# Patient Record
Sex: FEMALE | Race: WHITE | Employment: OTHER | URBAN - METROPOLITAN AREA
[De-identification: names, ages, dates, MRNs, and addresses within clinical notes are randomized per-mention and may not be internally consistent; named-entity substitution may affect disease eponyms.]

---

## 2018-07-11 LAB — ANTIBODY: NORMAL

## 2018-11-29 LAB — MAMMOGRAPHY, EXTERNAL: NORMAL

## 2022-03-07 ENCOUNTER — OFFICE VISIT (OUTPATIENT)
Dept: PRIMARY CARE CLINIC | Age: 72
End: 2022-03-07
Payer: COMMERCIAL

## 2022-03-07 VITALS
SYSTOLIC BLOOD PRESSURE: 128 MMHG | BODY MASS INDEX: 28.61 KG/M2 | DIASTOLIC BLOOD PRESSURE: 82 MMHG | RESPIRATION RATE: 22 BRPM | HEART RATE: 88 BPM | WEIGHT: 178 LBS | OXYGEN SATURATION: 98 % | HEIGHT: 66 IN

## 2022-03-07 DIAGNOSIS — R73.03 PREDIABETES: ICD-10-CM

## 2022-03-07 DIAGNOSIS — R30.0 BURNING WITH URINATION: ICD-10-CM

## 2022-03-07 DIAGNOSIS — Z00.00 HEALTHCARE MAINTENANCE: ICD-10-CM

## 2022-03-07 DIAGNOSIS — R20.8 BURNING SENSATION: Primary | ICD-10-CM

## 2022-03-07 DIAGNOSIS — D22.9 SKIN MOLE: ICD-10-CM

## 2022-03-07 DIAGNOSIS — H53.9 VISION CHANGES: ICD-10-CM

## 2022-03-07 PROBLEM — G89.29 CHRONIC BILATERAL LOW BACK PAIN WITH BILATERAL SCIATICA: Status: ACTIVE | Noted: 2018-07-11

## 2022-03-07 PROBLEM — M54.42 CHRONIC BILATERAL LOW BACK PAIN WITH BILATERAL SCIATICA: Status: ACTIVE | Noted: 2018-07-11

## 2022-03-07 PROBLEM — M54.41 CHRONIC BILATERAL LOW BACK PAIN WITH BILATERAL SCIATICA: Status: ACTIVE | Noted: 2018-07-11

## 2022-03-07 LAB
BILIRUBIN URINE: NEGATIVE
BILIRUBIN, POC: NORMAL
BLOOD URINE, POC: NORMAL
BLOOD, URINE: NEGATIVE
CLARITY, POC: NORMAL
CLARITY: CLEAR
COLOR, POC: NORMAL
COLOR: YELLOW
GLUCOSE URINE, POC: NORMAL
GLUCOSE URINE: NEGATIVE MG/DL
KETONES, POC: NORMAL
KETONES, URINE: NEGATIVE MG/DL
LEUKOCYTE EST, POC: NORMAL
LEUKOCYTE ESTERASE, URINE: NEGATIVE
MICROSCOPIC EXAMINATION: NORMAL
NITRITE, POC: NORMAL
NITRITE, URINE: NEGATIVE
PH UA: 6 (ref 5–8)
PH, POC: 5
PROTEIN UA: NEGATIVE MG/DL
PROTEIN, POC: NORMAL
SPECIFIC GRAVITY UA: 1.01 (ref 1–1.03)
SPECIFIC GRAVITY, POC: <=1.005
URINE REFLEX TO CULTURE: NORMAL
URINE TYPE: NORMAL
UROBILINOGEN, POC: 0.2
UROBILINOGEN, URINE: 0.2 E.U./DL

## 2022-03-07 PROCEDURE — 1123F ACP DISCUSS/DSCN MKR DOCD: CPT | Performed by: STUDENT IN AN ORGANIZED HEALTH CARE EDUCATION/TRAINING PROGRAM

## 2022-03-07 PROCEDURE — 1036F TOBACCO NON-USER: CPT | Performed by: STUDENT IN AN ORGANIZED HEALTH CARE EDUCATION/TRAINING PROGRAM

## 2022-03-07 PROCEDURE — 36415 COLL VENOUS BLD VENIPUNCTURE: CPT | Performed by: STUDENT IN AN ORGANIZED HEALTH CARE EDUCATION/TRAINING PROGRAM

## 2022-03-07 PROCEDURE — G8417 CALC BMI ABV UP PARAM F/U: HCPCS | Performed by: STUDENT IN AN ORGANIZED HEALTH CARE EDUCATION/TRAINING PROGRAM

## 2022-03-07 PROCEDURE — 99204 OFFICE O/P NEW MOD 45 MIN: CPT | Performed by: STUDENT IN AN ORGANIZED HEALTH CARE EDUCATION/TRAINING PROGRAM

## 2022-03-07 PROCEDURE — 3017F COLORECTAL CA SCREEN DOC REV: CPT | Performed by: STUDENT IN AN ORGANIZED HEALTH CARE EDUCATION/TRAINING PROGRAM

## 2022-03-07 PROCEDURE — G8400 PT W/DXA NO RESULTS DOC: HCPCS | Performed by: STUDENT IN AN ORGANIZED HEALTH CARE EDUCATION/TRAINING PROGRAM

## 2022-03-07 PROCEDURE — G8427 DOCREV CUR MEDS BY ELIG CLIN: HCPCS | Performed by: STUDENT IN AN ORGANIZED HEALTH CARE EDUCATION/TRAINING PROGRAM

## 2022-03-07 PROCEDURE — 4040F PNEUMOC VAC/ADMIN/RCVD: CPT | Performed by: STUDENT IN AN ORGANIZED HEALTH CARE EDUCATION/TRAINING PROGRAM

## 2022-03-07 PROCEDURE — 81002 URINALYSIS NONAUTO W/O SCOPE: CPT | Performed by: STUDENT IN AN ORGANIZED HEALTH CARE EDUCATION/TRAINING PROGRAM

## 2022-03-07 PROCEDURE — G8484 FLU IMMUNIZE NO ADMIN: HCPCS | Performed by: STUDENT IN AN ORGANIZED HEALTH CARE EDUCATION/TRAINING PROGRAM

## 2022-03-07 PROCEDURE — 1090F PRES/ABSN URINE INCON ASSESS: CPT | Performed by: STUDENT IN AN ORGANIZED HEALTH CARE EDUCATION/TRAINING PROGRAM

## 2022-03-07 PROCEDURE — 81003 URINALYSIS AUTO W/O SCOPE: CPT | Performed by: STUDENT IN AN ORGANIZED HEALTH CARE EDUCATION/TRAINING PROGRAM

## 2022-03-07 SDOH — ECONOMIC STABILITY: FOOD INSECURITY: WITHIN THE PAST 12 MONTHS, YOU WORRIED THAT YOUR FOOD WOULD RUN OUT BEFORE YOU GOT MONEY TO BUY MORE.: NEVER TRUE

## 2022-03-07 SDOH — ECONOMIC STABILITY: FOOD INSECURITY: WITHIN THE PAST 12 MONTHS, THE FOOD YOU BOUGHT JUST DIDN'T LAST AND YOU DIDN'T HAVE MONEY TO GET MORE.: NEVER TRUE

## 2022-03-07 ASSESSMENT — PATIENT HEALTH QUESTIONNAIRE - PHQ9
1. LITTLE INTEREST OR PLEASURE IN DOING THINGS: 0
2. FEELING DOWN, DEPRESSED OR HOPELESS: 0
SUM OF ALL RESPONSES TO PHQ9 QUESTIONS 1 & 2: 0
SUM OF ALL RESPONSES TO PHQ QUESTIONS 1-9: 0

## 2022-03-07 ASSESSMENT — ENCOUNTER SYMPTOMS
ABDOMINAL PAIN: 0
SHORTNESS OF BREATH: 0
ROS SKIN COMMENTS: LESIONS ON SCALP
WHEEZING: 0

## 2022-03-07 ASSESSMENT — SOCIAL DETERMINANTS OF HEALTH (SDOH): HOW HARD IS IT FOR YOU TO PAY FOR THE VERY BASICS LIKE FOOD, HOUSING, MEDICAL CARE, AND HEATING?: NOT HARD AT ALL

## 2022-03-07 NOTE — ASSESSMENT & PLAN NOTE
Lesions could be SKs, but skin cancer needs to be ruled out.   Patient is agreeable for referral to dermatologist.

## 2022-03-07 NOTE — PROGRESS NOTES
[facility]    Patient:  Janina Quinteros 73 y.o. female     Date of Service: 3/7/2022       Chief complaint:   Chief Complaint   Patient presents with    New Patient     Np to establish    Blood Work     Rquesting for blood work.  Mole    Other     c/o having burning sensation on bilateral feets.  Urinary Tract Infection     c/o burning sensation, frequent urination. History of Present Illness   Patient presents today to establish care. She reports PMHx of prediabetes. Patient is currently not on any medication. Past surgical history consisting of left knee replacement. Family history reviewed. Patient denies smoking cigarettes, drinking alcohol, or recreational drug use. Prediabetes: Diagnosed several years ago. She has not been on any medication. She checks blood sugar at home with variable results. Last medical visit and assessment was around 6 years ago. Moles on the scalp: Recurrent; s/p multiple laser procedures. Typically after the laser procedure the lesions goes away completely then returns after a few months. Most recent procedure was about 6-7 years ago. Denies pain. Denies bleeding. Burning sensation bilateral feet: Started about 10 years ago. Of note, patient's second, third, and fourth metatarsal digits of bilateral feet have been numb for several years. Patient reports she has been evaluated by many doctors and specialists over many years, without any explanation for that. Patient now complaining of burning sensation in the soles of both feet. She has not seen a podiatrist for almost 6 years. Burning with urination: this started 3-4 years, recurrent. She usually does not get it treated. She denies hematuria. She denies frequency or lower abdominal pressure. She denies cloudiness of urine. Visual disturbance: She has problem with reading, no problems with distance.   She is requesting for referral to optometrist.         Past Medical History:      Diagnosis Date    Prediabetes        Past Surgical History:        Procedure Laterality Date    KNEE SURGERY Left        Allergies:    Patient has no known allergies. Social History:   Social History     Socioeconomic History    Marital status:      Spouse name: Not on file    Number of children: Not on file    Years of education: Not on file    Highest education level: Not on file   Occupational History    Not on file   Tobacco Use    Smoking status: Never Smoker    Smokeless tobacco: Never Used   Substance and Sexual Activity    Alcohol use: Not on file    Drug use: Not on file    Sexual activity: Not on file   Other Topics Concern    Not on file   Social History Narrative    Not on file     Social Determinants of Health     Financial Resource Strain: Low Risk     Difficulty of Paying Living Expenses: Not hard at all   Food Insecurity: No Food Insecurity    Worried About [address] [employer] in the Last Year: Never true    [address] [employer] in the Last Year: Never true   Transportation Needs:     Lack of Transportation (Medical): Not on file    Lack of Transportation (Non-Medical):  Not on file   Physical Activity:     Days of Exercise per Week: Not on file    Minutes of Exercise per Session: Not on file   Stress:     Feeling of Stress : Not on file   Social Connections:     Frequency of Communication with Friends and Family: Not on file    Frequency of Social Gatherings with Friends and Family: Not on file    Attends Confucianist Services: Not on file    Active Member of Clubs or Organizations: Not on file    Attends Club or Organization Meetings: Not on file    Marital Status: Not on file   Intimate Partner Violence:     Fear of Current or Ex-Partner: Not on file    Emotionally Abused: Not on file    Physically Abused: Not on file    Sexually Abused: Not on file   Housing Stability:     Unable to Pay for Housing in the Last Year: Not on file    Number of Places Lived in the Last Year: Not on file    Unstable Housing in the Last Year: Not on file       Family History:       Problem Relation Age of Onset    Diabetes Mother        Reviewof Systems:   Review of Systems   Constitutional: Negative for fever. Eyes: Positive for visual disturbance. Respiratory: Negative for shortness of breath and wheezing. Cardiovascular: Negative for chest pain and palpitations. Gastrointestinal: Negative for abdominal pain. Skin:        Lesions on scalp   Neurological:        Burning in bilateral soles of feet       Physical Exam   Vitals: /82   Pulse 88   Resp 22   Ht 5' 6\" (1.676 m)   Wt 178 lb (80.7 kg)   SpO2 98%   BMI 28.73 kg/m²   Physical Exam  Constitutional:       Appearance: Normal appearance. Cardiovascular:      Rate and Rhythm: Normal rate and regular rhythm. Pulses: Normal pulses. Heart sounds: Normal heart sounds. Pulmonary:      Effort: Pulmonary effort is normal.      Breath sounds: Normal breath sounds. Neurological:      Mental Status: She is alert and oriented to person, place, and time. Psychiatric:         Mood and Affect: Mood normal.         Behavior: Behavior normal.     Diabetic foot exam  Visual inspection:  Deformity/amputation: absent  Skin lesions/pre-ulcerative calluses: absent  Edema: right- negative, left- negative  Temp: warm    Sensory exam:  Monofilament sensation: decreased sensations in the 2nd, 3rd, and 4th digits of bilateral feet, normal sensation everywhere else  Pulses: normal    Results for POC orders placed in visit on 03/07/22   POCT Urinalysis no Micro   Result Value Ref Range    Color, UA      Clarity, UA      Glucose, UA POC NEG     Bilirubin, UA NEG     Ketones, UA NEG     Spec Grav, UA <=1.005     Blood, UA POC SMALL     pH, UA 5.0     Protein, UA POC NEG     Urobilinogen, UA 0.2     Leukocytes, UA NEG     Nitrite, UA NEG          Assessment and Plan   1. Burning sensation  Assessment & Plan:    This might be from prediabetes developing diabetes or an unrelated neuropathy. Patient wants to proceed by podiatrist and referral has been made. Orders:  -     POCT Urinalysis no Micro  -     Urinalysis with Reflex to Culture  -     AFL - Ramiro Flores., DPMONO, Podiatry, [place]  -     Urinalysis with Reflex to Culture  2. Burning with urination  Assessment & Plan:  UA unremarkable. Urine culture ordered. Unclear if this is from UTI. Orders:  -     Urinalysis with Reflex to Culture  -     Urinalysis with Reflex to Culture  3. Prediabetes  -     Hemoglobin A1C  -      DIABETES FOOT EXAM  4. Skin mole  Assessment & Plan:  Lesions could be SKs, but skin cancer needs to be ruled out. Patient is agreeable for referral to dermatologist.  Orders:  -     Janine Mayer MD, Dermatology, [facility]  5. Vision changes  -     [facility]  6. Healthcare maintenance  -     Hemoglobin A1C  -     CBC with Auto Differential  -     Comprehensive Metabolic Panel  -     TSH with Reflex to FT4  -     LIPID PANEL      Issues to address at future visit/s:     Return to Office: Return in about 4 weeks (around 4/4/2022) for AWV. Medication List:    No current outpatient medications on file. No current facility-administered medications for this visit. Electronically signed by Chriss Mann MD on 3/7/2022 at 3:33 PM     This dictation was generated by voice recognition computer software. Although all attempts are made to edit the dictation for accuracy, there may be errors in the transcription that are not intended.

## 2022-03-07 NOTE — PATIENT INSTRUCTIONS
Patient Education       ÒíÇÑÉ ÇáÝÍÕ ÇáØÈí¡ áÃÔÎÇÕ LEEOTFB ÃßÈÑ ãä 72 ÚÇãðÇ: ÅÑÔÇÏÇÊ ÇáÑÚÇíÉ  Well Visit, Over 72: Care Instructions  äÙÑÉ ÚÇãÉ  íãßä Ãä ÊÓÇÚÏß ÒíÇÑÇÊ ÇáÝÍÕ Úáì ÇáÍÝÇÙ Úáì ÕÍÊß? . ÝÍÕ ØÈíÈß ÕÍÊß ÇáÚÇãÉ æÑÈãÇ ÇÞÊÑÍ ØÑÞÇ ááÚäÇíÉ ÈäÝÓß ÌíÏÇ? Mayur Leyland ÞÏ íæÕí ØÈíÈß ÃíÖÇ ÈÅÌÑÇÁ RFGHMAJHOG Ýí ÇáÈíÊ¡ íãßäß ÇáãÓÇÚÏÉ Ýí ÇáæÞÇíÉ ãä ÇáãÑÖ Úä ØÑíÞ ÊäÇæá ÇáØÚÇã ÇáÕÍí æããÇÑÓÉ ÇáÊãÇÑíä ÇáÑíÇÖíÉ ÈÇäÊÙÇã æÛíÑåÇ ãä ÇáÎØæÇÊ? Mayur Muhammadyland ÊõÚÏ ÑÚÇíÉ ÇáãÊÇÈÚÉ ÌÒÁðÇ ÃÓÇÓíðÇ Ýí ÚáÇÌß æÓáÇãÊß. ÝÚáíß ÇáÍÑÕ Úáì ÊÑÊíÈ ÌãíÚ ãæÇÚíÏ ÒíÇÑÉ ÇáØÈíÈ GVFLUEQHL ÈåÇ¡ XUHBETHJ ÈØÈíÈß ÚäÏ MSKHXXRJ ãä Ãí ãÔßáÇÊ. æãä ÇáÌíÏ ÃíÖðÇ Ãä ÊÚÑÝ äÊÇÆÌ EJCIVTIK æßÐáß SOTEPYUB ÈÞÇÆãÉ ÇáÃÏæíÉ ÇáÊí LKJUSKEF. ßíÝ ÊÚÊäí ÈäÝÓß Ýí YWMDXV¿   ÇÎÖÚ CIOWPPFZE ÇáÝÍÕ ÇáÊí ÊÞÑÑåÇ ÃäÊ æØÈíÈß? Mayur Muhammadyland íÓÇÚÏ ÇáÝÍÕ Ýí ßÔÝ PXXQYSU ÞÈá ÙåæÑ Ãí ÃÚÑÇÖ? Mayur Leyland  ÊäÇæá ÇáÃØÚãÉ ÇáÕÍíÉ ÇÎÊÑ ÇáÝæÇßå¡ ZHSEAYILH¡ æÇáÍÈæÈ XRRWVAB¡ æÇáÈÑæÊíä¡ æãäÊÌÇÊ ÇáÃáÈÇä ÞáíáÉ ÇáÏÓã? Hutchison Lehman ÇáÏåæä¡ æÎÇÕÉ ÇáÏåæä ÇáãÔÈÚÉ? Twylla Navy ÇáãáÍ Ýí ÇáäÙÇã ÇáÛÐÇÆí? Cuadra Rasher ãä SORBNX? Mayur Leyland ÅÐÇ ßäÊ ÑÌáÇ¡ áÇ ÊÔÑÈ ÃßËÑ ãä ãÔÑæÈíä Ýí Çáíæã Ãæ 14 ãÔÑæÈÇ Ýí ÇáÃÓÈæÚ? Mayur Leyland ÅÐÇ ßäÊ ÇãÑÃÉ¡ áÇ ÊÔÑÈí ÃßËÑ ãä ãÔÑæÈ æÇÍÏ Ýí Çáíæã Ãæ 7 ãÔÑæÈÇÊ Ýí ÇáÃÓÈæÚ? Mayur Leyland äÙÑÇ áÃä ÇáßÍæá íÄËÑ Úáì ßÈÇÑ ÇáÓä Úáì äÍæ ãÎÊáÝ¡ ÝÞÏ ÊÑÛÈ Ýí ÇáÊÞáíá ãäåÇ? Mayur Leyland Ãæ ÞÏ áÇ ÊÑÛÈ Ýí ÇáÔÑÈ Úáì ÇáÅØáÇÞ? Mayur Leyland  ãÇÑÓ 30 ÏÞíÞÉ Úáì ÇáÃÞá ãä ÇáÊãÇÑíä ÇáÑíÇÖíÉ Ýí ãÚÙã ÃíÇã ÇáÃÓÈæÚ ÇáãÔí åæ ÎíÇÑ ÍÓä? Mayur Leyland ÞÏ ÊÑÛÈ ÃíÖÇ Ýí ããÇÑÓÉ ÃäÔØÉ ÃÎÑì¡ ãËá ÇáÌÑí Ãæ LUDKFEL Ãæ ÑßæÈ ÇáÏÑÇÌÇÊ Ãæ áÚÈ ÇáÊäÓ Ãæ ÇáÑíÇÖÇÊ ÇáÌãÇÚíÉ? .   ÇÈáÛ æÍÇÝÙ Úáì æÒä ÕÍí ÓíÄÏí Ðáß Åáì ÊÞáíá ÎØÑ ÇáÅÕÇÈÉ ÈÇáÚÏíÏ ãä ASUXOPK¡ ãËá ÇáÓãäÉ æÇáÓßÑí æÃãÑÇÖ ÇáÞáÈ æÇÑÊÝÇÚ ÖÛØ ÇáÏã? Mayur Muhammadyland Donis Areola ÊÏÎä? Pegge Aurelia íÝÇÞã ÇáÊÏÎíä ÇáãÔÇßá ÇáÕÍíÉ? Mayur Leyland ÅÐÇ ÇÍÊÌÊ Åáì ãÓÇÚÏÉ ááÅÞáÇÚ Úä ÇáÊÏÎíä¡ ÝÊÍÏË Åáì ØÈíÈß Úä ÈÑÇãÌ ÇáÅÞáÇÚ Úä ÇáÊÏÎíä æÇáÚÞÇÞíÑ? . ÞÏ ÊÒíÏ Êáß ãä ÝÑÕß Ýí ÇáÅÞáÇÚ Úä ÇáÊÏÎíä Åáì ÇáÃÈÏ? Mayur Leyland Treinta Y Dwain 2070 PXAVDHA? Damaris Kapadiahant ÇáÓåá MWHBLCZLJ Åáì ÇáÞáÞ æÇáÅÌåÇÏ? . ÊÚáã ÅÓÊÑÇÊíÌíÇÊ áÅÏÇÑÉ ÇáÅÌåÇÏ¡ ãËá ÇáÊäÝÓ ÇáÚãíÞ WMEYILQ ÇáæÇÚí¡ æÇáÈÞÇÁ Úáì ÇÊÕÇá EUBYJYD æãÍíØß? Mayur RAZO æÌÏÊ Ãäß ÊÔÚÑ ÈÇáÍÒä Ãæ ÇáíÃÓ¡ ÝÚáíß ÇáÊÍÏË ãÚ ØÈíÈß? [address] íäÝÚ ÇáÚáÇÌ? .   Úáíß ÇáÊÍÏË ãÚ ØÈíÈß ÚãÇ ÅÐÇ ßÇä ÚäÏß Ãí ÚæÇãá ÎØÑ ááÃãÑÇÖ VAXWRPVI ÌäÓíÇ? Delfino Jenna íãßäß ÇáãÓÇÚÏÉ Ýí ÇáæÞÇíÉ ãä ÇáÃãÑÇÖ XVQHKSKP ÌäÓíÇ ÅÐÇ ÇäÊÙÑÊ ÞÈá ããÇÑÓÉ ÇáÌäÓ ãÚ ÔÑíß ÌÏíÏ (Ãæ ÔÑßÇÁ) ÍÊì íÌÑí ßáÇßãÇ ÇÎÊÈÇÑÇ ááßÔÝ Úä ÇáÃãÑÇÖ EPEJTKIG ÌäÓíÇ? Delfino Jenna ßãÇ íÍÓä ÇÓÊÚãÇá ÇáÚÇÒá (ÇáæÇÞí ÇáÐßÑí Ãæ ÇáÃäËæí) æÅÐÇ ßäÊ ÊÞÊÕÑ Úáì ÔÑíß æÇÍÏ áÇ íãÇÑÓ ÇáÌäÓ ãÚ ÛíÑß? . NMNGFURI ãÊæÝÑÉ áÈÚÖ ÇáÃãÑÇÖ IJVSAJCF ÌäÓíÇ? Delfino Jenna  ÅÐÇ ßäÊ ÊÙä Ãäß ÞÏ ÊæÇÌå ãÔßáÉ ãÚ ÊÚÇØí ZECMKV Ãæ ÇáÚÞÇÞíÑ¡ ÝÊÍÏË Åáì ØÈíÈß? . æíÔãá Ðáß ÇáÃÏæíÉ ÇáãæÕæÝÉ ØÈíÇ (ãËá ÇáÃãÝíÊÇãíäÇÊ æÇáãæÇÏ ÇáÃÝíæäíÉ) æÇáÚÞÇÞíÑ ÛíÑ ÇáãÔÑæÚÉ (ãËá ÇáßæßÇííä æÇáãíËÇãÝíÊÇãíä)? . íãßä áØÈíÈß ãÓÇÚÏÊß Ýí ãÚÑÝÉ äæÚ ÇáÚáÇÌ ÇáÃäÓÈ áß? .   ÇÍã ÈÔÑÊß ãä ÇáÊÚÑÖ ÇáãÝÑØ áÃÔÚÉ ÇáÔãÓ ÚäÏãÇ ÊÎÑÌ ãä 10 ÕÈÇÍÇ Åáì 4 ãÓÇÁ¡ ÇãßË Ýí ÇáÙá Ãæ ÊÛØ CKPRLGIJ æÇÑÊÏí ÞÈÚÉ ÐÇÊ ÍÇÝÉ æÇÓÚÉ? Deberah Has ÇáäÙÇÑÇÊ ÇáÔãÓíÉ ÇáÊí ÊãäÚ ÇáÃÔÚÉ ÝæÞ ÇáÈäÝÓÌíÉ? . ÍÊì ÚäÏãÇ íßæä ÇáÌæ ÛÇÆãÇ¡ ÖÚ æÇÞí ÇáÔãÓ æÇÓÚ ÇáØíÝ? (SPF 30 ? Ãæ ÃÚáì? ) ? Toy Damaso ÌáÏ ãßÔæÝ? .   Úáíß ÚíÇÏÉ ØÈíÈ ÇáÃÓäÇä ãÑÉ Ãæ ãÑÊíä Ýí ÇáÓäÉ áÅÌÑÇÁ [id number] æÊäÙíÝ ÃÓäÇäß? Delfino Angieamoto  ÇÑÊÏ ÍÒÇã ÇáÃãÇä Ýí ÇáÓíÇÑÉ? .  ãÊì íäÈÛí Úáíßö [id number] áØáÈ ÇáãÓÇÚÏÉ¿  ÑÇÞÈ Úä ßËÈ ÇáÊÛííÑÇÊ ÇáÊí ÊÍÏË Ýí ÕÍÊß¡ æÇÍÑÕ Úáì ÇáÇÊÕÇá ÈÇáØÈíÈ ÅÐÇ ßäÊ ÊÚÇäí ãä Ãí ãÔÇßá Ãæ ÃÚÑÇÖ ÊËíÑ ÞáÞß. Ãíä íãßäß ãÚÑÝÉ ÇáãÒíÏ¿  ÇäÊÞÇá Åáì   [email or web address]  ÃÏÎá K859 Ýí ãÑÈÚ ÇáÈÍË áãÚÑÝÉ ÇáãÒíÏ Íæá \"ÒíÇÑÉ ÇáÝÍÕ ÇáØÈí¡ áÃÔÎÇÕ ÃÚãÇÑåã ÃßÈÑ ãä 72 ÚÇãðÇ: ÅÑÔÇÏÇÊ ÇáÑÚÇíÉ. \"  Lonza Caller [id number] ãä: 14 ÊãæÒ 8820               äÓÎÉ TCFGVZK: 13.1  © 2607-6922 [employer]. Êã ÊÚÏíá ÅÑÔÇÏÇÊ ÇáÑÚÇíÉ ÈãæÌÈ ÊÑÎíÕ ÕÇÏÑ ãä ÇÎÊÕÇÕí ÇáÑÚÇíÉ ÇáÕÍíÉ ÇáÎÇÕ Èß. ÅÐÇ ßÇäÊ áÏíß ÃÓÆáÉ OLYPI ÈÍÇáÉ ãÑÖíÉ Ãæ ÈåÐå ÇáÊÚáíãÇÊ¡ ÝÇÍÑÕ Úáì ÇáÑÌæÚ ÏÇÆãðÇ Åáì ÇÎÊÕÇÕí ÇáÑÚÇíÉ ÇáÕÍíÉ. ÊõÎáí ÔÑßÉ [employer] [id number] Úä Ãí ÖãÇä Ãæ ÇáÊÒÇã íÊÚáÞ [id number] áåÐå [id number].

## 2022-03-07 NOTE — ASSESSMENT & PLAN NOTE
This might be from prediabetes developing diabetes or an unrelated neuropathy. Patient wants to proceed by podiatrist and referral has been made.

## 2022-03-08 LAB
A/G RATIO: 1.8 (ref 1.1–2.2)
ALBUMIN SERPL-MCNC: 4.6 G/DL (ref 3.4–5)
ALP BLD-CCNC: 68 U/L (ref 40–129)
ALT SERPL-CCNC: 16 U/L (ref 10–40)
ANION GAP SERPL CALCULATED.3IONS-SCNC: 23 MMOL/L (ref 3–16)
AST SERPL-CCNC: 19 U/L (ref 15–37)
BASOPHILS ABSOLUTE: 0 K/UL (ref 0–0.2)
BASOPHILS RELATIVE PERCENT: 0.6 %
BILIRUB SERPL-MCNC: 0.3 MG/DL (ref 0–1)
BUN BLDV-MCNC: 14 MG/DL (ref 7–20)
CALCIUM SERPL-MCNC: 9.7 MG/DL (ref 8.3–10.6)
CHLORIDE BLD-SCNC: 105 MMOL/L (ref 99–110)
CHOLESTEROL, TOTAL: 175 MG/DL (ref 0–199)
CO2: 17 MMOL/L (ref 21–32)
CREAT SERPL-MCNC: 0.6 MG/DL (ref 0.6–1.2)
EOSINOPHILS ABSOLUTE: 0.2 K/UL (ref 0–0.6)
EOSINOPHILS RELATIVE PERCENT: 2.4 %
ESTIMATED AVERAGE GLUCOSE: 125.5 MG/DL
GFR AFRICAN AMERICAN: >60
GFR NON-AFRICAN AMERICAN: >60
GLUCOSE BLD-MCNC: 107 MG/DL (ref 70–99)
HBA1C MFR BLD: 6 %
HCT VFR BLD CALC: 38.6 % (ref 36–48)
HDLC SERPL-MCNC: 40 MG/DL (ref 40–60)
HEMOGLOBIN: 13.3 G/DL (ref 12–16)
LDL CHOLESTEROL CALCULATED: 91 MG/DL
LYMPHOCYTES ABSOLUTE: 1.6 K/UL (ref 1–5.1)
LYMPHOCYTES RELATIVE PERCENT: 23.3 %
MCH RBC QN AUTO: 31.1 PG (ref 26–34)
MCHC RBC AUTO-ENTMCNC: 34.4 G/DL (ref 31–36)
MCV RBC AUTO: 90.4 FL (ref 80–100)
MONOCYTES ABSOLUTE: 0.5 K/UL (ref 0–1.3)
MONOCYTES RELATIVE PERCENT: 7.2 %
NEUTROPHILS ABSOLUTE: 4.6 K/UL (ref 1.7–7.7)
NEUTROPHILS RELATIVE PERCENT: 66.5 %
PDW BLD-RTO: 13.9 % (ref 12.4–15.4)
PLATELET # BLD: 220 K/UL (ref 135–450)
PMV BLD AUTO: 8.8 FL (ref 5–10.5)
POTASSIUM SERPL-SCNC: 4.1 MMOL/L (ref 3.5–5.1)
RBC # BLD: 4.27 M/UL (ref 4–5.2)
SODIUM BLD-SCNC: 145 MMOL/L (ref 136–145)
TOTAL PROTEIN: 7.2 G/DL (ref 6.4–8.2)
TRIGL SERPL-MCNC: 218 MG/DL (ref 0–150)
TSH REFLEX FT4: 1.2 UIU/ML (ref 0.27–4.2)
VLDLC SERPL CALC-MCNC: 44 MG/DL
WBC # BLD: 6.9 K/UL (ref 4–11)

## 2022-03-09 LAB — URINE CULTURE, ROUTINE: NORMAL

## 2022-03-14 ENCOUNTER — TELEPHONE (OUTPATIENT)
Dept: PRIMARY CARE CLINIC | Age: 72
End: 2022-03-14

## 2022-03-14 DIAGNOSIS — D22.9 SKIN MOLE: Primary | ICD-10-CM

## 2022-03-14 NOTE — TELEPHONE ENCOUNTER
Patient's son called stating he needs a new referrals for Derm for his mother. He is requesting to refers her to  Advanced D[facility] at [facility]. Patient's son stated the order that we gave her is not  Cover by her insurance.

## 2022-03-15 NOTE — TELEPHONE ENCOUNTER
Please let patient's son know that I have submitted a referral for [facility]. They should call [phone] to schedule an appointment for the patient.     Duane Nails MD

## 2022-03-25 ENCOUNTER — APPOINTMENT (RX ONLY)
Dept: URBAN - METROPOLITAN AREA CLINIC 170 | Facility: CLINIC | Age: 72
Setting detail: DERMATOLOGY
End: 2022-03-25

## 2022-03-25 DIAGNOSIS — D485 NEOPLASM OF UNCERTAIN BEHAVIOR OF SKIN: ICD-10-CM

## 2022-03-25 DIAGNOSIS — L64.8 OTHER ANDROGENIC ALOPECIA: ICD-10-CM | Status: INADEQUATELY CONTROLLED

## 2022-03-25 PROBLEM — D48.5 NEOPLASM OF UNCERTAIN BEHAVIOR OF SKIN: Status: ACTIVE | Noted: 2022-03-25

## 2022-03-25 PROCEDURE — 99203 OFFICE O/P NEW LOW 30 MIN: CPT | Mod: 25

## 2022-03-25 PROCEDURE — ? ADDITIONAL NOTES

## 2022-03-25 PROCEDURE — 11103 TANGNTL BX SKIN EA SEP/ADDL: CPT

## 2022-03-25 PROCEDURE — 11102 TANGNTL BX SKIN SINGLE LES: CPT

## 2022-03-25 PROCEDURE — ? COUNSELING

## 2022-03-25 PROCEDURE — ? BIOPSY BY SHAVE METHOD

## 2022-03-25 ASSESSMENT — LOCATION SIMPLE DESCRIPTION DERM
LOCATION SIMPLE: RIGHT FOREHEAD
LOCATION SIMPLE: POSTERIOR SCALP

## 2022-03-25 ASSESSMENT — LOCATION ZONE DERM
LOCATION ZONE: FACE
LOCATION ZONE: SCALP

## 2022-03-25 ASSESSMENT — LOCATION DETAILED DESCRIPTION DERM
LOCATION DETAILED: MID-OCCIPITAL SCALP
LOCATION DETAILED: RIGHT FOREHEAD

## 2022-03-25 NOTE — HPI: SKIN LESION
What Type Of Note Output Would You Prefer (Optional)?: Bullet Format
How Severe Is Your Skin Lesion?: mild
Has Your Skin Lesion Been Treated?: not been treated
Is This A New Presentation, Or A Follow-Up?: Growths
Additional History: Patient has had laser on them before.

## 2022-03-25 NOTE — PROCEDURE: BIOPSY BY SHAVE METHOD
Detail Level: Detailed
Depth Of Biopsy: dermis
Was A Bandage Applied: Yes
Size Of Lesion In Cm: 0
Anticipated Plan (Based On Presumed Biopsy Results): Call with results
Biopsy Type: H and E
Biopsy Method: Dermablade
Anesthesia Type: 1% Xylocaine without epinephrine
Anesthesia Volume In Cc (Will Not Render If 0): 1
Hemostasis: Aluminum Chloride and Electrocautery
Wound Care: Aquaphor
Dressing: Band-Aid
Destruction After The Procedure: No
Type Of Destruction Used: Curettage
Curettage Text: The wound bed was treated with curettage after the biopsy was performed.
Cryotherapy Text: The wound bed was treated with cryotherapy after the biopsy was performed.
Electrodesiccation Text: The wound bed was treated with electrodesiccation after the biopsy was performed.
Electrodesiccation And Curettage Text: The wound bed was treated with electrodesiccation and curettage after the biopsy was performed.
Silver Nitrate Text: The wound bed was treated with silver nitrate after the biopsy was performed.
Lab: [id number]
Lab Facility: [id number]
Consent: Written consent was obtained and risks were reviewed including but not limited to scarring, infection, bleeding, scabbing, incomplete removal, nerve damage and allergy to anesthesia.
Post-Care Instructions: I reviewed with the patient in detail post-care instructions. Patient is to keep the biopsy site dry overnight, and then apply bacitracin twice daily until healed. Patient may apply hydrogen peroxide soaks to remove any crusting.
Notification Instructions: Patient will be notified of biopsy results. However, patient instructed to call the office if not contacted within 2 weeks.
Billing Type: Third-Party Bill
Information: Selecting Yes will display possible errors in your note based on the variables you have selected. This validation is only offered as a suggestion for you. PLEASE NOTE THAT THE VALIDATION TEXT WILL BE REMOVED WHEN YOU FINALIZE YOUR NOTE. IF YOU WANT TO FAX A PRELIMINARY NOTE YOU WILL NEED TO TOGGLE THIS TO 'NO' IF YOU DO NOT WANT IT IN YOUR FAXED NOTE.

## 2022-04-04 ENCOUNTER — OFFICE VISIT (OUTPATIENT)
Dept: PRIMARY CARE CLINIC | Age: 72
End: 2022-04-04
Payer: COMMERCIAL

## 2022-04-04 ENCOUNTER — CLINICAL DOCUMENTATION (OUTPATIENT)
Dept: SPIRITUAL SERVICES | Age: 72
End: 2022-04-04

## 2022-04-04 VITALS
BODY MASS INDEX: 27.97 KG/M2 | SYSTOLIC BLOOD PRESSURE: 112 MMHG | WEIGHT: 174 LBS | OXYGEN SATURATION: 96 % | DIASTOLIC BLOOD PRESSURE: 78 MMHG | HEIGHT: 66 IN | HEART RATE: 68 BPM | TEMPERATURE: 97.4 F | RESPIRATION RATE: 20 BRPM

## 2022-04-04 DIAGNOSIS — R76.8 ANA POSITIVE: ICD-10-CM

## 2022-04-04 DIAGNOSIS — Z00.00 INITIAL MEDICARE ANNUAL WELLNESS VISIT: Primary | ICD-10-CM

## 2022-04-04 PROCEDURE — G0439 PPPS, SUBSEQ VISIT: HCPCS | Performed by: STUDENT IN AN ORGANIZED HEALTH CARE EDUCATION/TRAINING PROGRAM

## 2022-04-04 PROCEDURE — 4040F PNEUMOC VAC/ADMIN/RCVD: CPT | Performed by: STUDENT IN AN ORGANIZED HEALTH CARE EDUCATION/TRAINING PROGRAM

## 2022-04-04 PROCEDURE — 1123F ACP DISCUSS/DSCN MKR DOCD: CPT | Performed by: STUDENT IN AN ORGANIZED HEALTH CARE EDUCATION/TRAINING PROGRAM

## 2022-04-04 PROCEDURE — 3017F COLORECTAL CA SCREEN DOC REV: CPT | Performed by: STUDENT IN AN ORGANIZED HEALTH CARE EDUCATION/TRAINING PROGRAM

## 2022-04-04 ASSESSMENT — PATIENT HEALTH QUESTIONNAIRE - PHQ9
1. LITTLE INTEREST OR PLEASURE IN DOING THINGS: 0
SUM OF ALL RESPONSES TO PHQ QUESTIONS 1-9: 0
2. FEELING DOWN, DEPRESSED OR HOPELESS: 0
SUM OF ALL RESPONSES TO PHQ QUESTIONS 1-9: 0
SUM OF ALL RESPONSES TO PHQ9 QUESTIONS 1 & 2: 0

## 2022-04-04 ASSESSMENT — LIFESTYLE VARIABLES: HOW OFTEN DO YOU HAVE A DRINK CONTAINING ALCOHOL: NEVER

## 2022-04-04 NOTE — PROGRESS NOTES
Medicare Annual Wellness Visit    Phillip Cali is here for Medicare AWV    Assessment & Plan   Initial Medicare annual wellness visit  -     Ambulatory Referral to ACP Clinical Specialist  PASQUALE positive  Assessment & Plan:  Non specific abnormal blood test results (ordered by podiatrist). Patient has been referred to rheumatologist.  Orders:  -     Alejandra Babin MD, Rheumatology, [place]      Recommendations for Preventive Services Due: see orders and patient instructions/AVS.  Recommended screening schedule for the next 5-10 years is provided to the patient in written form: see Patient Instructions/AVS.     Return in 3 months (on 7/4/2022) for prediabetes follow . Subjective   The following acute and/or chronic problems were also addressed today:    Burning and numbness in feet: unclear etiology. She was referred to podiatrist, had some blood test which showed positive PASQUALE and elevated vitamin B6. Patient's complete Health Risk Assessment and screening values have been reviewed and are found in Flowsheets. The following problems were reviewed today and where indicated follow up appointments were made and/or referrals ordered.     Positive Risk Factor Screenings with Interventions:               General Health and ACP:  General  In general, how would you say your health is?: Excellent  In the past 7 days, have you experienced any of the following: New or Increased Pain, New or Increased Fatigue, Loneliness, Social Isolation, Stress or Anger?: No  Do you get the social and emotional support that you need?: Yes  Do you have a Living Will?: (!) No    Advance Directives     Power of  Living Will ACP-Advance Directive ACP-Power of     Not on File Not on File Not on File Not on File      General Health Risk Interventions:  · No Living Will: Advance Care Planning addressed with patient today and Patient referred to [place] Habits/Nutrition:     Physical Activity: Insufficiently Active    Days of Exercise per Week: 3 days    Minutes of Exercise per Session: 30 min     Have you lost any weight without trying in the past 3 months?: No  Body mass index: (!) 28.08  Have you seen the dentist within the past year?: (!) No    Health Habits/Nutrition Interventions:  · Dental exam overdue:  patient will see a dentist when she travels back to her country for vacation. Hearing/Vision:  Do you or your family notice any trouble with your hearing that hasn't been managed with hearing aids?: No     Have you had an eye exam within the past year?: (!) No  No exam data present    Hearing/Vision Interventions:  · Vision concerns:  patient encouraged to make appointment with his/her eye specialist            Objective   Vitals:    04/04/22 0825   BP: 112/78   Pulse: 68   Resp: 20   Temp: 97.4 °F (36.3 °C)   SpO2: 96%   Weight: 174 lb (78.9 kg)   Height: 5' 6\" (1.676 m)      Body mass index is 28.08 kg/m².         General Appearance: alert and oriented to person, place and time, well-developed and well-nourished, in no acute distress  Pulmonary/Chest: clear to auscultation bilaterally- no wheezes, rales or rhonchi, normal air movement, no respiratory distress  Cardiovascular: normal rate, normal S1 and S2, no murmurs, no gallops, intact distal pulses and no carotid bruits  Extremities: no cyanosis, no clubbing and no edema       No Known Allergies  Prior to Visit Medications    Not on File       CareTeam (Including outside providers/suppliers regularly involved in providing care):   Patient Care Team:  Malena Ruiz MD as PCP - General (Family Medicine)  Malena Ruiz MD as PCP - [facility] Empaneled Provider    Reviewed and updated this visit:  Tobacco  Allergies  Meds  Med Hx  Surg Hx  Soc Hx  Fam Hx

## 2022-04-04 NOTE — ASSESSMENT & PLAN NOTE
Non specific abnormal blood test results (ordered by podiatrist).  Patient has been referred to rheumatologist.

## 2022-04-04 NOTE — ACP (ADVANCE CARE PLANNING)
Advance Care Planning   Ambulatory ACP Specialist Patient Outreach    Date:  4/4/2022  ACP Specialist:  Tiffanie Israel    Outreach call to patient in follow-up to ACP Specialist referral from: Stacey Pride MD    [x] PCP  [] Provider   [] Ambulatory Care Management [] Other for Reason:    [x] Advance Directive Assistance  [] Code Status Discussion  [] Complete Portable DNR Order  [] Discuss Goals of Care  [] Complete POST/MOST  [] Early ACP Decision-Making  [] Other    Date Referral Received:4/4/2022    Today's Outreach:  [x] First   [] Second  [] Third                               Third outreach made by []  phone  [] email []   [employer]     Intervention:  [x] Spoke with Patient's son  [] Left VM requesting return call      Outcome: Patient's son answered and recorded the [employer] phone number. He will call us back when he has an opportunity to be with his mother so we can record her HCDMs and care preferences. Next Step:   [] ACP scheduled conversation  [x] Outreach again in one week               [] Email / Mail ACP Info Sheets  [] Email / Mail Advance Directive            [] Close Referral. Routing closure to referring provider/staff and to ACP Specialist .      Thank you for this referral.

## 2022-04-04 NOTE — PATIENT INSTRUCTIONS
Patient Education        Patient Education       ÒíÇÑÉ ÇáÝÍÕ ÇáØÈí ÇáÚÇã¡ ÇáäÓÇÁ ãä Óä 50 ÚÇãðÇ Åáì 72 ÚÇãðÇ: ÅÑÔÇÏÇÊ ÇáÑÚÇíÉ  Well Visit, Women 50 to 72: Care Instructions  äÙÑÉ ÚÇãÉ  íãßä Ãä ÊÓÇÚÏß ÒíÇÑÇÊ ÇáÝÍÕ Úáì ÇáÍÝÇÙ Úáì ÕÍÊß? . ÝÍÕ ØÈíÈß ÕÍÊß ÇáÚÇãÉ æÑÈãÇ ÇÞÊÑÍ ØÑÞÇ ááÚäÇíÉ ÈäÝÓß ÌíÏÇ? Lizzyandrés Logan ÞÏ íæÕí ØÈíÈß ÃíÖÇ ÈÅÌÑÇÁ XBRHGPAZAB Ýí ÇáÈíÊ¡ íãßäß ÇáãÓÇÚÏÉ Ýí ÇáæÞÇíÉ ãä ÇáãÑÖ Úä ØÑíÞ ÊäÇæá ÇáØÚÇã ÇáÕÍí æããÇÑÓÉ ÇáÊãÇÑíä ÇáÑíÇÖíÉ ÈÇäÊÙÇã æÛíÑåÇ ãä ÇáÎØæÇÊ? Lizzyandrés Logan ÊõÚÏ ÑÚÇíÉ ÇáãÊÇÈÚÉ ÌÒÁðÇ ÃÓÇÓíðÇ Ýí ÚáÇÌß æÓáÇãÊß. ÝÚáíß ÇáÍÑÕ Úáì ÊÑÊíÈ ÌãíÚ ãæÇÚíÏ ÒíÇÑÉ ÇáØÈíÈ TRUZRSZSW ÈåÇ¡ NPPRJLQG ÈØÈíÈß ÚäÏ BEVOWYLT ãä Ãí ãÔßáÇÊ. æãä ÇáÌíÏ ÃíÖðÇ Ãä ÊÚÑÝ äÊÇÆÌ EAPDQYDF æßÐáß BLTJARFX ÈÞÇÆãÉ ÇáÃÏæíÉ ÇáÊí MPHXBTFK. ßíÝ ÊÚÊäí ÈäÝÓß Ýí RMETFK¿   ÇÎÖÚ JDZVNBTHS ÇáÝÍÕ ÇáÊí ÊÞÑÑåÇ ÃäÊ æØÈíÈß? Lizzy Logan íÓÇÚÏ ÇáÝÍÕ Ýí ßÔÝ CBEEVVG ÞÈá ÙåæÑ Ãí ÃÚÑÇÖ? Lizzy Doreen  ÊäÇæá ÇáÃØÚãÉ ÇáÕÍíÉ ÇÎÊÑ ÇáÝæÇßå¡ YMSYTPSWE¡ æÇáÍÈæÈ UYJXXOT¡ æÇáÈÑæÊíä¡ æãäÊÌÇÊ ÇáÃáÈÇä ÞáíáÉ ÇáÏÓã? Carmell Every ÇáÏåæä¡ æÎÇÕÉ ÇáÏåæä ÇáãÔÈÚÉ? Websterville Epley ÇáãáÍ Ýí ÇáäÙÇã ÇáÛÐÇÆí? Everardo Contreras ãä OLUKZW? . ÊäÇæá ãÇ áÇ íÒíÏ Úä ãÔÑæÈ æÇÍÏ Ýí Çáíæã Ãæ 7 ãÔÑæÈÇÊ Ýí ÇáÃÓÈæÚ? Lizzy Doreen  ãÇÑÓ 30 ÏÞíÞÉ Úáì ÇáÃÞá ãä ÇáÊãÇÑíä ÇáÑíÇÖíÉ Ýí ãÚÙã ÃíÇã ÇáÃÓÈæÚ ÇáãÔí åæ ÎíÇÑ ÍÓä? ÞÏ ÊÑÛÈ ÃíÖÇ Ýí ããÇÑÓÉ ÃäÔØÉ ÃÎÑì¡ ãËá ÇáÌÑí Ãæ KQXFMMO Ãæ ÑßæÈ ÇáÏÑÇÌÇÊ Ãæ áÚÈ ÇáÊäÓ Ãæ ÇáÑíÇÖÇÊ ÇáÌãÇÚíÉ? .   ÇÈáÛ æÍÇÝÙ Úáì æÒä ÕÍí ÓíÄÏí Ðáß Åáì ÊÞáíá ÎØÑ ÇáÅÕÇÈÉ ÈÇáÚÏíÏ ãä AYKDQZW¡ ãËá ÇáÓãäÉ æÇáÓßÑí æÃãÑÇÖ ÇáÞáÈ æÇÑÊÝÇÚ ÖÛØ ÇáÏã? Lizzy Doreen Naveen Javi ÊÏÎä? Nusrat Marrow íÝÇÞã ÇáÊÏÎíä ÇáãÔÇßá ÇáÕÍíÉ? Lizzy Logan ÅÐÇ ÇÍÊÌÊ Åáì ãÓÇÚÏÉ ááÅÞáÇÚ Úä ÇáÊÏÎíä¡ ÝÊÍÏË Åáì ØÈíÈß Úä ÈÑÇãÌ ÇáÅÞáÇÚ Úä ÇáÊÏÎíä æÇáÚÞÇÞíÑ? . ÞÏ ÊÒíÏ Êáß ãä ÝÑÕß Ýí ÇáÅÞáÇÚ Úä ÇáÊÏÎíä Åáì ÇáÃÈÏ? Lizzy Doreen Treinta Y Dwain 2070 LCBAEMP? Wyvonne Kimmy ÇáÓåá TCCDTRJAF Åáì ÇáÞáÞ æÇáÅÌåÇÏ? . ÊÚáã ÅÓÊÑÇÊíÌíÇÊ áÅÏÇÑÉ ÇáÅÌåÇÏ¡ ãËá ÇáÊäÝÓ ÇáÚãíÞ FOSETVS ÇáæÇÚí¡ æÇáÈÞÇÁ Úáì ÇÊÕÇá VEWERKV æãÍíØß? Lizzy Doreen ÅÐÇ æÌÏÊ Ãäß ÊÔÚÑ YPCLWR Ãæ ÇáíÃÓ¡ ÝÚáíß ÇáÊÍÏË ãÚ ØÈíÈß? Nusrat Marrow íäÝÚ ÇáÚáÇÌ? .   Úáíß ÇáÊÍÏË ãÚ ØÈíÈß ÚãÇ ÅÐÇ ßÇä ÚäÏß Ãí ÚæÇãá ÎØÑ ááÃãÑÇÖ TBLHBWTS ÌäÓíÇ? Lizzy Doreen  íãßäß ÇáãÓÇÚÏÉ Ýí ÇáæÞÇíÉ ãä ÇáÃãÑÇÖ TNDZJYUI ÌäÓíÇ ÅÐÇ ÇäÊÙÑÊ ÞÈá ããÇÑÓÉ ÇáÌäÓ ãÚ ÔÑíß ÌÏíÏ (Ãæ ÔÑßÇÁ) ÍÊì íÌÑí ßáÇßãÇ ÇÎÊÈÇÑÇ ááßÔÝ Úä ÇáÃãÑÇÖ TLLQKWFL ÌäÓíÇ? Benedetta Ou ßãÇ íÍÓä ÇÓÊÚãÇá ÇáÚÇÒá (ÇáæÇÞí ÇáÐßÑí Ãæ ÇáÃäËæí) æÅÐÇ ßäÊ ÊÞÊÕÑ Úáì ÔÑíß æÇÍÏ áÇ íãÇÑÓ ÇáÌäÓ ãÚ ÛíÑß? . ECKXYFGE ãÊæÝÑÉ áÈÚÖ ÇáÃãÑÇÖ PZXVOIUV ÌäÓíÇ? Benedetta Ou  ÅÐÇ ßäÊ ÊÙä Ãäß ÞÏ ÊæÇÌå ãÔßáÉ ãÚ ÊÚÇØí XGIPRC Ãæ ÇáÚÞÇÞíÑ¡ ÝÊÍÏË Åáì ØÈíÈß? . æíÔãá Ðáß ÇáÃÏæíÉ ÇáãæÕæÝÉ ØÈíÇ (ãËá ÇáÃãÝíÊÇãíäÇÊ æÇáãæÇÏ ÇáÃÝíæäíÉ) æÇáÚÞÇÞíÑ ÛíÑ ÇáãÔÑæÚÉ (ãËá ÇáßæßÇííä æÇáãíËÇãÝíÊÇãíä)? . íãßä áØÈíÈß ãÓÇÚÏÊß Ýí ãÚÑÝÉ äæÚ ÇáÚáÇÌ ÇáÃäÓÈ áß? .   ÇÍã ÈÔÑÊß ãä ÇáÊÚÑÖ ÇáãÝÑØ áÃÔÚÉ ÇáÔãÓ ÚäÏãÇ ÊÎÑÌ ãä 10 ÕÈÇÍÇ Åáì 4 ãÓÇÁ¡ ÇãßË Ýí ÇáÙá Ãæ ÊÛØ ZKCUTCEE æÇÑÊÏí ÞÈÚÉ ÐÇÊ ÍÇÝÉ æÇÓÚÉ? Latasha Lacrosse ÇáäÙÇÑÇÊ ÇáÔãÓíÉ ÇáÊí ÊãäÚ ÇáÃÔÚÉ ÝæÞ ÇáÈäÝÓÌíÉ? . ÍÊì ÚäÏãÇ íßæä ÇáÌæ ÛÇÆãÇ¡ ÖÚ æÇÞí ÇáÔãÓ æÇÓÚ ÇáØíÝ? (SPF 30 ? Ãæ ÃÚáì? ) ? Karyn Guoyle ÌáÏ ãßÔæÝ? .   Úáíß ÚíÇÏÉ ØÈíÈ ÇáÃÓäÇä ãÑÉ Ãæ ãÑÊíä Ýí ÇáÓäÉ áÅÌÑÇÁ HYFUKBYR æÊäÙíÝ ÃÓäÇäß? Benedetta Ou  ÇÑÊÏ ÍÒÇã ÇáÃãÇä Ýí ÇáÓíÇÑÉ? .  ãÊì íäÈÛí Úáíßö KHJFNGV áØáÈ ÇáãÓÇÚÏÉ¿  ÑÇÞÈ Úä ßËÈ ÇáÊÛííÑÇÊ ÇáÊí ÊÍÏË Ýí ÕÍÊß¡ æÇÍÑÕ Úáì ÇáÇÊÕÇá ÈÇáØÈíÈ ÅÐÇ ßäÊ ÊÚÇäí ãä Ãí ãÔÇßá Ãæ ÃÚÑÇÖ ÊËíÑ ÞáÞß. Ãíä íãßäß ãÚÑÝÉ ÇáãÒíÏ¿  ÇäÊÞÇá Åáì   [email or web address]  Dejah [email or web address] Ýí ãÑÈÚ ÇáÈÍË áãÚÑÝÉ ÇáãÒíÏ Íæá \"ÒíÇÑÉ ÇáÝÍÕ ÇáØÈí ÇáÚÇã¡ ÇáäÓÇÁ ãä Óä 50 ÚÇãðÇ Åáì 72 ÚÇãðÇ: ÅÑÔÇÏÇÊ ÇáÑÚÇíÉ. \"  ÓÇÑò DQBJYPPM ãä: 6 ÊÔÑíä ÇáÃæá 2021               äÓÎÉ ÇáãÍÊæì: 13.2  © 9949-1323 [employer]. Êã ÊÚÏíá ÅÑÔÇÏÇÊ ÇáÑÚÇíÉ ÈãæÌÈ ÊÑÎíÕ ÕÇÏÑ ãä ÇÎÊÕÇÕí ÇáÑÚÇíÉ ÇáÕÍíÉ ÇáÎÇÕ Èß. ÅÐÇ ßÇäÊ áÏíß ÃÓÆáÉ WBGRT ÈÍÇáÉ ãÑÖíÉ Ãæ ÈåÐå ÇáÊÚáíãÇÊ¡ ÝÇÍÑÕ Úáì ÇáÑÌæÚ ÏÇÆãðÇ Åáì ÇÎÊÕÇÕí ÇáÑÚÇíÉ ÇáÕÍíÉ. ÊõÎáí ÔÑßÉ [employer] FBIUXPMDM Úä Ãí ÖãÇä Ãæ ÇáÊÒÇã íÊÚáÞ [id number] áåÐå SQSHRYKHT. Learning About Low-Carbohydrate Foods  What foods are low in carbohydrate? The foods you eat contain nutrients, such as vitamins and minerals. Carbohydrate is a nutrient. Your body needs the right amount to stay healthy and work as it should.  You can use the list below to help you make choicesabout which foods to eat. Some foods that are lower in carbohydrate include:  Dairy and dairy alternatives   Cheese   Cottage cheese   Cream cheese   Nut milk (unsweetened)   Soy milk (unsweetened)   Yogurt ([place], plain)  Fruits   Avocado   [employer] and other protein foods   Almonds   Beef   Chicken   Cod   Eggs   Halibut   Peanut butter and other nut butters   Pistachios   Pork   Pumpkin seeds   Tofu   Trout   [place]   [place] [facility]   Walnuts  Vegetables   Broccoli   Carrots   Cauliflower   Green beans   Mushrooms   Peppers   Salad greens   Spinach   Tomatoes  Work with your doctor to find out how much of this nutrient you need. Dependingon your health, you may need more or less of it in your diet. Where can you learn more? Go to [email or web address] and sign in to your [employer] account. Enter [phone] in the KylesBuxfer box to learn more about \"Learning About Low-Carbohydrate Foods. \"     If you do not have an account, please click on the \"Sign Up Now\" link. Current as of: September 8, 2021               Content Version: 13.2  © 2006-2022 [employer]. Care instructions adapted under license by [facility] ([facility]). If you have questions about a medical condition or this instruction, always ask your healthcare professional. [address] any warranty or liability for your use of this information. Patient Education       ÇáÊÚÑÝ Úáì ÊÚÏÇÏ ÇáßÑÈæåíÏÑÇÊ æÊäÇæá ÇáØÚÇã Ýí ÇáÎÇÑÌ ÚäÏãÇ íßæä ÚäÏß ãÑÖ ÇáÓßÑí  Learning About Carbohydrate (Carb) Counting and Eating Out When You Have Diabetes  áãÇÐÇ ÊäÙã æÌÈÇÊ ÇáØÚÇã¿    íãßä Ãä íßæä ÊäÙíã ÇáæÌÈÇÊ ÌÒÁ ÃÓÇÓíÇ ãä ÊÏÈíÑ ãÑÖ ÇáÓßÑí? Shireen Castro íãßä Ãä íÓÇÚÏß ÊäÙíã ÇáæÌÈÇÊ æÇáæÌÈÇÊ ÇáÎÝíÝÉ ãÚ ÇáÊæÇÒä ÇáÕÍíÍ ãä ÇáßÑÈæåíÏÑÇÊ æÇáÈÑæÊíä æÇáÏåæä Ýí ÇáÍÝÇÙ Úáì äÓÈÉ ÇáÓßÑ Ýí ÇáÏã ÚäÏ ÇáãÓÊæì ÇáãÓÊåÏÝ ÇáÐí ÍÏÏÊå ãÚ ØÈíÈß? Shireen Castro [facility] Ãä ÊÃßá ÃØÚãÉ ÎÇÕÉ? . Luanne Rowe ÊÃßá ãÇ ÊÃßáå ÚÇÆáÊß¡ ÈãÇ Ýíå ÇáÍáæíÇÊ Èíä Ííä æÇáÂÎÑ æáßä Úáíß Ãä Êæáí ÇåÊãÇãÇ áßíÝíÉ ÊäÇæá ÇáØÚÇã æãÞÏÇÑ ãÇ ÊÃßáå ãä ÈÚÖ ÇáÃØÚãÉ? Cherl Spinner ÞÏ ÊÑÛÈ Ýí ÇáÚãá ãÚ ÇÎÊÕÇÕí ÊÛÐíÉ Ãæ ÇÎÊÕÇÕí ÊæÚíÉ ãÚÊãÏ áãÑÖ ÇáÓßÑí? Cherl Spinner íãßäå Ãä íÚØíß äÕÇÆÍ æÃÝßÇÑ ÇáæÌÈÇÊ æíãßäå ÇáÅÌÇÈÉ Úáì ÃÓÆáÊß Úä ÊäÙíã ÇáæÌÈÇÊ? Cherl Spinner íãßä Ãä íÓÇÚÏß åÐÇ ÇáãÊÎÕÕ Ýí ÇáÕÍÉ ÃíÖÇ Ýí ÈáæÛ æÒä ÕÍí ÅÐÇ ßÇä åÐÇ ÃÍÏ ÃåÏÇÝß? .  ãÇ íäÈÛí OBTXIJ ÈÔÃä ÊäÇæá ÇáßÑÈæåíÏÑÇÊ¿  Åä ÇáÓíØÑÉ Úáì ãÞÏÇÑ ÇáßÑÈæåíÏÑÇÊ ÇáÊí DBMYPRBS ÌÒÁ ãåã ãä ÇáæÌÈÇÊ ÇáÕÍíÉ ÚäÏ ÇáÅÕÇÈÉ ÈÏÇÁ ÇáÓßÑí. æÊæÌÏ ÇáßÑÈæåíÏÑÇÊ Ýí ÇáÚÏíÏ ãä ÇáÃØÚãÉ.  æáÐáß íäÈÛí ãÚÑÝÉ ÇáÃØÚãÉ ÇáÊí ÊÍÊæí Úáì ÇáßÑÈæåíÏÑÇÊ. æßÐáß ÊÚÑøÝ Úáì ÇáãÞÇÏíÑ KFLDIWJL ãä ÇáßÑÈæåíÏÑÇÊ Ýí ÇáÃØÚãÉ ÇáãÊäæÚÉ. o ÝÇáÎÈÒ YNPHDFY ÇáÌÇåÒÉ ááÃßá PACZYVTLSS æÇáÃÑÒ ÊÍÊæí Úáì äÍæ 15 ÌÑÇãðÇ ãä ÇáßÑÈæåíÏÑÇÊ Ýí ÇáæÌÈÉ ÇáæÇÍÏÉ. æÇáæÌÈÉ åí Êáß ÇáÊí ÊÊÖãä ÔÑíÍÉ ÎÈÒ (ÃæäÕÉ æÇÍÏÉ) Ãæ ½ ßæÈ ãä VFLJLM OKAQPOPZ¡ Ãæ 1/3 ßæÈ ãä QUUNMGCED Ãæ ÇáÃÑÒ. o æÊÍÊæí ÇáÝæÇßå Úáì 15 ÌÑÇãðÇ ãä ÇáßÑÈæåíÏÑÇÊ Ýí ÇáæÌÈÉ. RLQIYRZ ÚÈÇÑÉ Úä ËãÑÉ ÝÇßåÉ æÇÍÏÉ ØÇÒÌÉ ãËá ÇáÊÝÇÍÉ Ãæ GREGKJHZMS Ãæ ½ ËãÑÉ ãæÒº Ãæ ½ ßæÈ ãä ÇáÝæÇßå RVREVQRB Ãæ JELPCSZMXW Ãæ ½ ßæÈ ãä ÚÕíÑ ÇáÝæÇßåº Ãæ ßæÈ æÇÍÏ ãä Çááíãæä Ãæ ÇáÊæÊº Ãæ ãáÚÞÊíä ãä ÇáÝæÇßå ÇáãÌÝÝÉ. o æíÍÊæí ÇááÈä æÇáÒÈÇÏí ÛíÑ ÇáãÖÇÝ Åáíå ÇáÓßÑ Úáì 15 ÌÑÇãðÇ ãä ÇáßÑÈæåíÏÑÇÊ Ýí ÇáæÌÈÉ. æÇáæÌÈÉ ÅãÇ ßæÈ ãä ÇááÈä Ãæ 2/3 ßæÈ ãä ÇáÒÈÇÏí ÛíÑ ÇáãÖÇÝ Åáíå ÇáÓßÑ. o æÊÍÊæí ÇáÎÖÑÇæÇÊ ÇáäÔæíÉ Úáì 15 ÌÑÇãðÇ ãä ÇáßÑÈæåíÏÑÇÊ Ýí ÇáæÌÈÉ. æÇáæÌÈÉ ÅãÇ ½ ßæÈ ãä ÇáÈØÇØÓ ÇáãåÑæÓÉ Ãæ ÇáÈØÇØÇº Ãæ ßæÈ æÇÍÏ ãä ÇáÞÑÚº Ãæ ½ ËãÑÉ ÈØÇØÇ ãÎÈæÒÉ ÕÛíÑÉº Ãæ ½ ßæÈ ãä MMZDXA LWLSZDQKI Ãæ ½ ßæÈ ãä ÇáÐÑÉ Ãæ VJXXFXHE ÇáÎÖÑÇÁ.  æíäÈÛí ãÚÑÝÉ ãÞÏÇÑ ÇáßÑÈæåíÏÑÇÊ ÇáÊí WVTZDNFZ ßá íæã æßá æÌÈÉ. æíãßä áÇÎÊÕÇÕí ÇáÊÛÐíÉ Ãæ OLQPTU ãÑÖì ÏÇÁ ÇáÓßÑí ÇáãÚÊãÏ ÊÚáíãß ßíÝíÉ Vi Sabas ãÞÏÇÑ ÇáßÑÈæåíÏÑÇÊ ÇáÊí GZCBQQDH. æåÐÇ íõØáÞ Úáíå ÅÍÕÇÁ ÇáßÑÈæåíÏÑÇÊ.  æÅÐÇ ÔßßÊ Ýí ßíÝíÉ ÍÕÑ ÌÑÇãÇÊ ÇáßÑÈæåíÏÑÇÊ¡ ÝÇÓÊÎÏã ØÑíÞÉ ÇáØÈÞ (Plate Method) áÊÎØíØ ÇáæÌÈÇÊ. Ýåí ØÑíÞÉ Cleopatra Trey ááÊÃßÏ ãä ZTGVCH Úáì Clara Wong.  ßãÇ íÓÇÚÏß ÃíÖðÇ Úáì ÊæÒíÚ ãÞÏÇÑ ÇáßÑÈæåíÏÑÇÊ ÎáÇá Çáíæã. o ÞÓøöã ÇáØÈÞ ÈÍÓÈ äæÚ ÇáØÚÇã. ÖÚ ÇáÎÖÑÇæÇÊ ÛíÑ ÇáäÔæíÉ Ýí äÕÝ ÇáØÈÞ¡ DWBYMV Ãæ ÇáÛÐÇÁ ÇáÈÑæÊíäí ÇáÂÎÑ Ýí ÑÈÚ ÇáØÈÞ¡ IOOJMAU Ãæ SQHTDPDWJ ÇáäÔæíÉ Ýí ÇáÑÈÚ ÇáÃÎíÑ ãä ÇáØÈÞ. æÅáì ãÇ ÓÈÞ íãßä ÅÖÇÝÉ ÞØÚÉ ÕÛíÑÉ ãä ÇáÝÇßåÉ Ãæ ßæÈ áÈä Ãæ ÒÈÇÏí ÈÍÓÈ ãÞÏÇÑ ÇáßÑÈæåíÏÑÇÊ ÇáÊí íõÝÊÑÖ MOHMRRW Ýí ÇáæÌÈÉ.  ÍÇæá ÊäÇæá äÍæ äÝÓ ãÞÏÇÑ ÇáßÑÈæåíÏÑÇÊ Ýí ßá æÌÈÉ. æáÇ \"ÊÏÎÑ\" CURVWCT ÇáãÓãæÍ ãä ÇáßÑÈæåíÏÑÇÊ Ýí Çáíæã áÊÊäÇæáå Ýí æÌÈÉ HVQVU.  æÃãÇ ÇáÈÑæÊíä ÝáÇ íÍÊæí ÅáÇ Úáì ÇáÞáíá ÌÏðÇ ãä ÇáßÑÈæåíÏÑÇÊ Ãæ íÎáæ ãäåÇ¡ Ýí ßá æÌÈÉ. æãä ÃãËáÉ ÇáÈÑæÊíä ÇááÍã ÇáÈÞÑí æÇáÏÌÇÌ æÇáÏíß ÇáÑæãí æÇáÓã æÇáÈíÖ æÇáÊæÝæ æÇáÌÈä æÇáÌÈä ÇáÞÑíÔ æÒÈÏÉ ÇáÝæá ÇáÓæÏÇäí. æÃãÇ ãÞÏÇÑ ÇáæÌÈÉ ãä JQZRUX ÝÜ 3 ÃæÞíÇÊ ÈÞÏÑ ÍÌã ãÌãæÚÉ ãä æÑÞ ÇááÚÈ. æãä FCPSDAP Úáì ÃÍÌÇã ÇáæÌÈÇÊ ÇáÈÏíáÉ ááÍã (ãÇ íÓÇæí ÃæäÕÉ æÇÍÏÉ ãä ÇááÍã) 1/4 ßæÈ ãä ÇáÌÈä ÇáÞÑíÔ Ãæ ÈíÖÉ Ãæ ãáÚÞÉ ãä ÒÈÏÉ ÇáÝæá ÇáÓæÏÇäí Ãæ ½ ßæÈ ãä ÇáÊæÝæ. ßíÝ íãßä ÊäÇæá ÇáØÚÇã Ýí ÇáãØÇÚã WDPDGSP ÇáÍÝÇÙ Úáì ÇáÕÍÉ¿   ÊÚáøã ÊÞÏíÑ ÍÌã æÌÈÇÊ ÇáØÚÇã ÇáÊí ÊÍÊæí Úáì ÇáßÑÈæåíÏÑÇÊ. æÅÐÇ ßäÊ ÊÞíÓ ÇáØÚÇã Ýí WFVOXC¡ ÝÓíßæä ÇáÃãÑ ÃÓåá ãä ÞíÇÓå Ýí æÌÈÉ ØÚÇã ÇáãØÇÚã.  æÅÐÇ ßÇäÊ ÇáæÌÈÉ ÇáÊí ØáÈÊ ÇáÍÕæá ÚáíåÇ ÊÍÊæí Úáì ÇáßËíÑ ãä ÇáßÑÈæåíÏÑÇÊ (ãËá ÇáÈØÇØÓ Ãæ ÇáÐÑÉ Ãæ CKMSBFLYI EMBLJZXQ)¡ ÝÇØáÈ JVEIMO Úáì ØÚÇã ãäÎÝÖ ÇáßÑÈæåíÏÑÇÊ ÈÏáÇð ãä Ðáß. ÇØáÈ YBRZTM Ãæ OWVDPRRZD ÇáÎÖÑÇÁ.  æÅÐÇ ßäÊ ÊÊÚÇØì ÇáÃäÓæáíä¡ ÝÊÍÞÞ ãä ãÓÊæì ÇáÓßÑ Ýí ÇáÏã ÞÈá ÊäÇæá ÇáØÚÇã Ýí ÇáãØÇÚã ááãÓÇÚÏÉ Úáì ÊÎØíØ ãÞÏÇÑ ÇáØÚÇã ÇáÐí EPLUBAH ãÓÊÞÈáÇð.  ÅÐÇ ÊäÇæáÊ ãÞÏÇÑðÇ ÒÇÆÏðÇ ãä ÇáßÑÈæåíÏÑÇÊ Ýí æÌÈÉ ÃßËÑ ãä ÇáãÎØØ áå¡ ÝÇÐåÈ ááÊäÒå Ãæ ãÇÑÓ ÈÚÖ ÇáÊãÇÑíä. ÝåÐÇ ÇáÅÌÑÇÁ ÓíÓÇÚÏ Ýí ÊÞáíá ãÓÊæì ÇáÓßÑ Ýí ÇáÏã áÏíß. ãÇ åí ÈÚÖ ÇáäÕÇÆÍ áÊäÇæá ÇáØÚÇã ÇáÕÍí¿   Þáá ãä ÇáÏåæä ÇáãÔÈÚÉ¡ ãËá ÇáÏåæä ãä ÇááÍæã æãäÊÌÇÊ ÇáÃáÈÇä? Nasra Dye åÐÇ ÎíÇÑ ÕÍí áÃä ÇáãÕÇÈíä ÈÇáÓßÑí ÃßËÑ ÚÑÖÉ ááÅÕÇÈÉ ÈÃãÑÇÖ ÇáÞáÈ? Nasra Dye áÐÇ ÇÎÊÑ ÇáÞØÚ ÇáÎÇáíÉ ãä ÇáÏåæä ãä ÇááÍæã æãäÊÌÇÊ KAHEDQZ ÛíÑ ÇáÏåäíÉ Ãæ ÞáíáÉ ÇáÏÓã? Nasra Dye  ÇÓÊÚãá ÒíÊ ÇáÒíÊæä Ãæ OYDDEZXK ÚæÖ ÇáÒÈÏÉ Ãæ ÇáÏåæä ÇáÛÐÇÆíÉ ÚäÏ ÇáØåæ? Rawleigh Billing  áÇ ÊÊÎØì ÇáæÌÈÇÊ ÞÏ íäÎÝÖ ãÓÊæì ÇáÓßÑ Ýí ÇáÏã ÅÐÇ ÊÎØíÊ ÇáæÌÈÇÊ æÇÎÐÊ ÇáÃäÓæáíä Ãæ ÈÚÖ ÇáÃÏæíÉ áÚáÇÌ ãÑÖ ÇáÓßÑí? Rawleigh Billing  ÇÓÃá ØÈíÈß ÞÈá ÔÑÈ XFOHOJ? Cheryl Trimble ÊÓÈÈ QZSULB ÝÑØ ÇäÎÝÇÖ ÇáÓßÑ Ýí ÇáÏã? Rawleigh Billing ÞÏ ÊÓÈÈ ÇáßÍæá ÃíÖÇ ÍÓÇÓíÉ ÅÐÇ ßäÊ ÊÊäÇæá ÈÚÖ ÃÏæíÉ ãÑÖ ÇáÓßÑí? Rawleigh Billing ÊõÚÏ ÑÚÇíÉ ÇáãÊÇÈÚÉ ÌÒÁðÇ ÃÓÇÓíðÇ Ýí ÚáÇÌß æÓáÇãÊß. ÝÚáíß ÇáÍÑÕ Úáì ÊÑÊíÈ ÌãíÚ ãæÇÚíÏ ÒíÇÑÉ ÇáØÈíÈ YKWYMWUDB ÈåÇ¡ UYVGYLCO ÈØÈíÈß ÚäÏ MCVBHDCJ ãä Ãí ãÔßáÇÊ. æãä ÇáÌíÏ ÃíÖðÇ Ãä ÊÚÑÝ äÊÇÆÌ VHEPLNGQ æßÐáß OCORSFHX ÈÞÇÆãÉ ÇáÃÏæíÉ ÇáÊí SMNZDHIK.  Ãíä íãßäß ãÚÑÝÉ ÇáãÒíÏ¿  ÇäÊÞÇá Åáì   [email or web address]  ÃÏÎá I80 Ýí ãÑÈÚ ÇáÈÍË áãÚÑÝÉ ÇáãÒíÏ Íæá \"ÇáÊÚÑÝ Úáì ÊÚÏÇÏ ÇáßÑÈæåíÏÑÇÊ æÊäÇæá ÇáØÚÇã Ýí ÇáÎÇÑÌ ÚäÏãÇ íßæä ÚäÏß ãÑÖ ÇáÓßÑí. \"  Demarco JOHNSONGKY ãä: 8 AOKS 4562               äÓÎÉ ÇáãÍÊæì: 13.2  © 8347-8806 [employer]. Êã ÊÚÏíá ÅÑÔÇÏÇÊ ÇáÑÚÇíÉ ÈãæÌÈ ÊÑÎíÕ ÕÇÏÑ ãä ÇÎÊÕÇÕí ÇáÑÚÇíÉ ÇáÕÍíÉ ÇáÎÇÕ Èß. ÅÐÇ ßÇäÊ áÏíß ÃÓÆáÉ HDHEF ÈÍÇáÉ ãÑÖíÉ Ãæ ÈåÐå ÇáÊÚáíãÇÊ¡ ÝÇÍÑÕ Úáì ÇáÑÌæÚ ÏÇÆãðÇ Åáì ÇÎÊÕÇÕí ÇáÑÚÇíÉ ÇáÕÍíÉ. ÊõÎáí ÔÑßÉ [employer]wise [id number] Úä Ãí ÖãÇä Ãæ ÇáÊÒÇã íÊÚáÞ [id number] áåÐå [id number]. Personalized Preventive Plan for Parker Fillers - 4/4/2022  Medicare offers a range of preventive health benefits. Some of the tests and screenings are paid in full while other may be subject to a deductible, co-insurance, and/or copay. Some of these benefits include a comprehensive review of your medical history including lifestyle, illnesses that may run in your family, and various assessments and screenings as appropriate. After reviewing your medical record and screening and assessments performed today your provider may have ordered immunizations, labs, imaging, and/or referrals for you. A list of these orders (if applicable) as well as your Preventive Care list are included within your After Visit Summary for your review.     Other Preventive Recommendations:    · A preventive eye exam performed by an eye specialist is recommended every 1-2 years to screen for glaucoma; cataracts, macular degeneration, and other eye disorders. · A preventive dental visit is recommended every 6 months. · Try to get at least 150 minutes of exercise per week or 10,000 steps per day on a pedometer . · Order or download the FREE \"Exercise & Physical Activity: Your Everyday Guide\" from [employer]. Call 6-772.511.5763 or search [employer] online. · You need 8828-4268 mg of calcium and 8629-1750 IU of vitamin D per day. It is possible to meet your calcium requirement with diet alone, but a vitamin D supplement is usually necessary to meet this goal.  · When exposed to the sun, use a sunscreen that protects against both UVA and UVB radiation with an SPF of 30 or greater. Reapply every 2 to 3 hours or after sweating, drying off with a towel, or swimming. · Always wear a seat belt when traveling in a car. Always wear a helmet when riding a bicycle or motorcycle.

## 2022-04-15 ENCOUNTER — CLINICAL DOCUMENTATION (OUTPATIENT)
Dept: SPIRITUAL SERVICES | Age: 72
End: 2022-04-15

## 2022-04-15 NOTE — ACP (ADVANCE CARE PLANNING)
Advance Care Planning   Ambulatory ACP Specialist Patient Outreach    Date:  4/15/2022  ACP Specialist:  Mary Ann Beasley    Outreach call to patient in follow-up to ACP Specialist referral from: Sherin Barkley MD    [x] PCP  [] Provider   [] Ambulatory Care Management [] Other for Reason:    [x] Advance Directive Assistance  [] Code Status Discussion  [] Complete Portable DNR Order  [] Discuss Goals of Care  [] Complete POST/MOST  [] Early ACP Decision-Making  [] Other    Date Referral Received:4/4/2022    Today's Outreach:  [] First   [x] Second  [] Third                               Third outreach made by []  phone  [] email []   [place]     Intervention:  [] Spoke with Patient  [] Left VM requesting return call      Outcome: ACP Specialist spoke with pt's son, mailing a blank copy of Ads per request, pt will complete independently. Provided phone number in case they need assistance. Can close referral.      Next Step:   [] ACP scheduled conversation  [] Outreach again in one week               [x] Email / Mail ACP Info Sheets  [x] Email / Mail Advance Directive            [x] Close Referral. Routing closure to referring provider/staff and to ACP Specialist .      Thank you for this referral.

## 2022-06-17 NOTE — PROGRESS NOTES
Trevor Lawler MD  [facility]) Physicians - Rheumatology    [x] [facility]:  [facility]  [address] [facility] [] [facility]:  [address]   Office: [phone]  Fax: [phone]     RHEUMATOLOGY CONSULT NOTE    ASSESSMENT/PLAN:  Bert Villeda is a 73 y.o. female referred by Pam Cox MD for a positive PASQUALE. PMHx includes prediabetes. Past rheum meds:  Naproxen: ineffective  Gabapentin 100 mg TID     1. PASQUALE positive  Assessment & Plan:  - recently found to have an low titer PASQUALE of 1:80 in speckled pattern by podiatrist on 3/18/22 during w/u for paresthesias of b/l feet. Based on her clinical Hx, I do not think her current Sx are related to an underlying rheumatologic condition, refer to below #2 for her chronic paresthesias w/u. ROS positive for mild sicca otherwise unimpressive for a CTD. Reviewed prior labs on Care Everywhere, no Hx of cytopenias. - the PASQUALE test was not designed as a screening test and the [employer] of Rheumatology does not support using it as such. Up to 1/4 of healthy adults, particularly female, can have a low titer PASQUALE and also do not go in to develop rheumatic diseases. Up to 50% of elderly patients have a positive PASQUALE. PASQUALE can be positive in viral infections, those with a family history of rheumatic disease, those with other autoimmune diseases like GI or thyroid disease, cancer, as well as many other conditions. Once a patient has a positive PASQUALE, it does not need to be retested, unless symptoms change and there is an increased suspicion for a rheumatic disease. Orders:  -     C3 Complement; Future  -     C4 Complement; Future  -     PASQUALE Reflex to Antibody Cascade; Future  -     Anti SSA; Future  -     Anti SSB; Future  -     Creatinine; Future  -     CBC with Auto Differential; Future  -     Cardiolipin Antibodies IgG & IgM; Future  -     Beta-2 Glycoprotein Antibodies;  Future  -     Lupus Anticoagulant; Future  2. History of recurrent miscarriages  -     C3 Complement; Future  -     C4 Complement; Future  -     PASQUALE Reflex to Antibody Cascade; Future  -     CBC with Auto Differential; Future  -     Cardiolipin Antibodies IgG & IgM; Future  -     Beta-2 Glycoprotein Antibodies; Future  -     Lupus Anticoagulant; Future  3. Paresthesia of foot, bilateral  Assessment & Plan:  - isolated paresthesias in the bottom of b/l feet w/ symmetrical numbness in b/l 2-4th toes. I do not think this is related to an underlying rheumatologic condition including her low PASQUALE titer. Reviewed prior w/u on Care Everywhere, her EMG study of BLE on 7/30/18 showed b/l chronic lumbosacral radiculopathies. MRI of L-spine from 7/30/18 showed degenerative changes including moderate b/l facet arthropathy at L4-5 and asymmetric left lateral recess narrowing with moderate to severe left L4 foraminal narrowing.   - made referral to [facility], consider Neurosurgery evaluation. - she was prescribed low dose Gabapentin by her former PCP in the past but did not recall taking this medicine. She agreed to try Gabapentin 300 mg QHS. Orders:  -     Virgen Ordonez MD, Pain Management, [facility]  -     gabapentin (NEURONTIN) 300 MG capsule; Take 1 capsule by mouth nightly for 30 days. , Disp-30 capsule, R-1Normal  -     Cardiolipin Antibodies IgG & IgM; Future  -     Beta-2 Glycoprotein Antibodies; Future  -     Lupus Anticoagulant; Future  4. Lumbosacral radiculopathy  Assessment & Plan:  - EMG proven. Made referral to [facility] as above #2. Consider Neurosurgery evaluation for her ongoing Sx. Orders:  -     Virgen Ordonez MD, Pain Management, [facility]  -     gabapentin (NEURONTIN) 300 MG capsule; Take 1 capsule by mouth nightly for 30 days. , Disp-30 capsule, R-1Normal    Return in about 6 weeks (around 8/3/2022) for lab result discussion and treatment plan, medication monitoring.   6/22/2022 9:12 AM The risks and benefits of my recommendations, as well as other treatment options, benefits and side effects were discussed w/ the pt today. Questions were answered. NOTE: This report is transcribed by using voice recognition software dragon. Every effort is made to ensure accuracy; however, inadvertent computerized transcription errors may be present. Consult note will be sent to the referring provider. Thank you very much for allowing me to participate in this pt's care. Please do not hesitate to contact me if I can be of further assistance. HISTORY OF PRESENT ILLNESS:      Hx was taken via the pt's son as the pt does not speak [place]. Son reports a 15-20 yr Hx of numbness and burning pain in the bottom of her feet. Pt reports a burning pain in the bottom of her feet. She reports numbness and sensation of weakness in her b/l 2-4th toes. Sx are exacerbated by walking and alleviates after rest. Toes \"feel like they're on fire\" after walking. Denies any other paresthesias. Denies back pain. Denies leg weakness, bowel or bladder incontinence or saddle anesthesia. Pt was found to have a low titer PASQUALE of 1:80 in speckled pattern during recent w/u in March by Podiatry for her chronic paresthesias. She denies photosensitivity or rash. She reports generalized hair thinning over the past 20 yrs. She reports \"occasional\" canker sores on her inner lip when she gets sick. She reports \"50%\" dryness in her eyes and chronic dry mouth. Denies Sx of Raynaud's phenomenon. Denies Hx of blood clots, CVA or MI. She tells me she's had 3 miscarriages between 2-4 months gestation age. 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 She has six living children. Denies known FHx of autoimmune disease. She is a never smoker.  She is originally from [place], she currently lives w/ her son.    REVIEW OF SYSTEMS:    Constitutional: denies chronic fatigue, fever/chills, night sweats, unintentional weight loss  Integumentary: denies photosensitivity, rash, patchy alopecia, or Sx of Raynaud's phenomenon  Eyes: +mild dry eyes, denies redness or pain, visual disturbance, or floaters  Nose: denies nasal ulcers or recurrent sinusitis  Oral cavity: +mild dry mouth, denies oral ulcers  Cardiovascular: denies CP, palpitations, Hx of pericardial effusion or pericarditis  Respiratory: denies SOB, cough, hemoptysis, or pleurisy  Gastrointestinal: denies heart burn, dysphagia or esophageal dysmotility, denies change in bowel habits or Sx of IBD  Hematologic/Lymphatic: denies abnormal bruising or bleeding, denies Hx of blood clots or recurrent miscarriages, denies swollen LNs  Musculoskeletal:  refer to above HPI   Neurological: +chronic numbness and painful paresthesias in the bottom of her feet, denies focal weakness, denies Hx of seizure, denies change in gait, balance, or memory    Past Medical History:   Diagnosis Date    Prediabetes         Past Surgical History:   Procedure Laterality Date    KNEE SURGERY Left        Social History     Socioeconomic History    Marital status:      Spouse name: Not on file    Number of children: Not on file    Years of education: Not on file    Highest education level: Not on file   Occupational History    Not on file   Tobacco Use    Smoking status: Never Smoker    Smokeless tobacco: Never Used   Vaping Use    Vaping Use: Never used   Substance and Sexual Activity    Alcohol use: Never    Drug use: Never    Sexual activity: Not on file   Other Topics Concern    Not on file   Social History Narrative    Not on file     Social Determinants of Health     Financial Resource Strain: Low Risk     Difficulty of Paying Living Expenses: Not hard at all   Food Insecurity: No Food Insecurity    Worried About Running Out of Food in the Last Year: Never true   [employer] Ran Out of Food in the Last Year: Never true   Transportation Needs:     Lack of Transportation (Medical): Not on file    Lack of Transportation (Non-Medical): Not on file   Physical Activity: Insufficiently Active    Days of Exercise per Week: 3 days    Minutes of Exercise per Session: 30 min   Stress:     Feeling of Stress : Not on file   Social Connections:     Frequency of Communication with Friends and Family: Not on file    Frequency of Social Gatherings with Friends and Family: Not on file    Attends [employer] Services: Not on file    Active Member of Clubs or Organizations: Not on file    Attends Club or Organization Meetings: Not on file    Marital Status: Not on file   Intimate Partner Violence:     Fear of Current or Ex-Partner: Not on file    Emotionally Abused: Not on file    Physically Abused: Not on file    Sexually Abused: Not on file   Housing Stability:     Unable to Pay for Housing in the Last Year: Not on file    Number of [place] in the Last Year: Not on file    Unstable Housing in the Last Year: Not on file        Family History   Problem Relation Age of Onset    Diabetes Mother        MEDICATIONS:    Current Outpatient Medications:     gabapentin (NEURONTIN) 300 MG capsule, Take 1 capsule by mouth nightly for 30 days. , Disp: 30 capsule, Rfl: 1    fish oil-omega-3 fatty acids 1000 MG capsule, Take 1 capsule by mouth daily, Disp: , Rfl:     ALLERGIES:  Patient has no known allergies.     PHYSICAL EXAM:    Vitals:    /77   Pulse 66     GEN: AAOx3, in NAD, well-appearing  HEAD: normocephalic, atraumatic  EYES: no injection or icterus  NOSE: no nasal ulcers or nasal drainage  ORAL CAVITY: moist oral mucosa w/ good saliva pooling, no oral lesions, no evidence of thrush, no evidence of parotid gland enlargement  CVS: RRR  LUNGS: in no acute respiratory distress, CTAB  MSK:  Spine: no kyphosis or lordosis, no paraspinal muscle or vertebral tenderness, SI joints NTTP  Upper extremities:   Hands: b/l PIP joints w/ mild bony enlargement otherwise no synovitis in the MCP, PIP, or DIP joints b/l, no dactylitis, able to make strong full fists, no evidence of sclerodactyly, calcinosis, digital ulcers or pitting   Wrist: no synovitis in the wrist joints b/l, FROM   Elbow: no synovitis or bursitis, FROM  Lower extremities:   Knees: no warmth or effusion present, FROM   Ankles: no synovitis, FROM, Achilles tendons w/o swelling or warmth NTTP   Feet: no toe swelling or pain or warmth on palpation w/ FROM, negative MTP squeeze test  NEURO: CN II-XII grossly intact intact, face appears symmetrical, no obvious evidence of muscle atrophy, manual muscle strength testing is 5/5 proximally and distally in the R and L upper and lower extremities  INTEGUMENT: no rash or psoriatic lesions, no petechiae, bruises, or palpable purpura, no patchy alopecia, no nail pitting or periungual changes, no clubbing or digital ulcers    Labs:   I personally reviewed prior labs including:    Lab Results   Component Value Date    WBC 6.9 03/07/2022    HGB 13.3 03/07/2022    HCT 38.6 03/07/2022    MCV 90.4 03/07/2022     03/07/2022    LYMPHOPCT 23.3 03/07/2022    RBC 4.27 03/07/2022    MCH 31.1 03/07/2022    MCHC 34.4 03/07/2022    RDW 13.9 03/07/2022     Lab Results   Component Value Date     03/07/2022    K 4.1 03/07/2022     03/07/2022    CO2 17 (L) 03/07/2022    BUN 14 03/07/2022    CREATININE 0.6 03/07/2022    GLUCOSE 107 (H) 03/07/2022    CALCIUM 9.7 03/07/2022    PROT 7.2 03/07/2022    LABALBU 4.6 03/07/2022    BILITOT 0.3 03/07/2022    ALKPHOS 68 03/07/2022    AST 19 03/07/2022    ALT 16 03/07/2022    LABGLOM >60 03/07/2022    GFRAA >60 03/07/2022    AGRATIO 1.8 03/07/2022     Lab Results   Component Value Date    CRP 3.6 03/18/2022     ESR 28 (7/11/18)    Negative PASQUALE (7/11/18) --> 1:80 speckled pattern (3/18/22)  TSH 1.20 (3/7/22)  Vitamin B12 592 (3/18/22)  HgbA1c 6.0 (3/7/22)    Radiology:  I personally reviewed prior imaging including:    MRI L-spine (7/30/18): FINDINGS:   Comparison to plain film study demonstrates a 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 Interosseous well-circumscribed lesions are noted at the T11, L2, L3 and S1 vertebral bodies which are bright on both T2 and T1-weighted imaging consistent with benign hemangiomas. Straightening of the normal lordosis. Desiccation of the intervertebral disc signal throughout the lumbar spine with loss of intervertebral disc height most pronounced at L4-5. T12-L1: Mild facet arthropathy. No significant central canal stenosis or neural foraminal narrowing. L1-2: Mild disc bulge but no significant central canal stenosis or neural foraminal narrowing. L2-3: Small left subarticular and foraminal disc protrusion and mild bilateral facet arthropathy. No significant central canal stenosis. Mild left neural foraminal narrowing. L3-4: Disc bulge with a small left lamina protrusion encroaching on the lateral recess causing mild left neural foraminal narrowing. Mild facet arthropathy. No significant central canal stenosis or right neural foraminal narrowing. L4-5: Disc osteophyte complex with left subarticular/foraminal disc protrusion and moderate bilateral facet arthropathy. Asymmetric left lateral recess narrowing with moderate to severe left L4 foraminal narrowing. The right foramen is moderately narrowed. L5-S1: No significant central canal stenosis or neural foraminal narrowing. Axial imaging was not performed through this level. IMPRESSION:   Degenerative changes of the lumbar spine as outlined above, most pronounced at L4-5. Transitional anatomy with sacralization of L5 vertebral body. DEXA study (11/29/18):  T-score 0.2 in L1-L4. T-score 1.2 in the L total hip. T-score 0.5 in the L femoral neck. T-score 2.4 in the R total hip.  T-score 1.1 in the R femoral neck. Procedures:  EMG of BLE (7/30/18): Summary:   The right fibular compound muscle action potential amplitude was low. Otherwise, the nerve conduction studies of the legs were normal. The needle examination showed mildly enlarged motor unit potentials in the right vastus lateralis and biceps femoris muscles and left anterior tibial and medial gastrocnemius muscles. Sparse fibrillation was seen in the right lumbosacral paraspinal muscles. Otherwise the needle exam was normal. Note: A new pre-sterilized needle electrode was used for the examination and discarded afterwards. Interpretation:   Abnormal EMG. There is evidence of mild, bilateral chronic lumbosacral radiculopathies with minimal evidence of ongoing denervation. The normal sural responses argue against the presence of a distal symmetric neuropathy. Above results were discussed w/ the pt in detail during today's visit.

## 2022-06-22 ENCOUNTER — OFFICE VISIT (OUTPATIENT)
Dept: RHEUMATOLOGY | Age: 72
End: 2022-06-22
Payer: COMMERCIAL

## 2022-06-22 VITALS — HEART RATE: 66 BPM | SYSTOLIC BLOOD PRESSURE: 116 MMHG | DIASTOLIC BLOOD PRESSURE: 77 MMHG

## 2022-06-22 DIAGNOSIS — R76.8 ANA POSITIVE: ICD-10-CM

## 2022-06-22 DIAGNOSIS — R76.8 ANA POSITIVE: Primary | ICD-10-CM

## 2022-06-22 DIAGNOSIS — R20.2 PARESTHESIA OF FOOT, BILATERAL: ICD-10-CM

## 2022-06-22 DIAGNOSIS — M54.17 LUMBOSACRAL RADICULOPATHY: ICD-10-CM

## 2022-06-22 DIAGNOSIS — N96 HISTORY OF RECURRENT MISCARRIAGES: ICD-10-CM

## 2022-06-22 LAB
BASOPHILS ABSOLUTE: 0.1 K/UL (ref 0–0.2)
BASOPHILS RELATIVE PERCENT: 0.7 %
C3 COMPLEMENT: 118.1 MG/DL (ref 90–180)
C4 COMPLEMENT: 23.5 MG/DL (ref 10–40)
CREAT SERPL-MCNC: 0.6 MG/DL (ref 0.6–1.2)
EOSINOPHILS ABSOLUTE: 0.2 K/UL (ref 0–0.6)
EOSINOPHILS RELATIVE PERCENT: 2.6 %
GFR AFRICAN AMERICAN: >60
GFR NON-AFRICAN AMERICAN: >60
HCT VFR BLD CALC: 40.3 % (ref 36–48)
HEMOGLOBIN: 13.6 G/DL (ref 12–16)
LYMPHOCYTES ABSOLUTE: 1.6 K/UL (ref 1–5.1)
LYMPHOCYTES RELATIVE PERCENT: 22.4 %
MCH RBC QN AUTO: 30.7 PG (ref 26–34)
MCHC RBC AUTO-ENTMCNC: 33.8 G/DL (ref 31–36)
MCV RBC AUTO: 90.6 FL (ref 80–100)
MONOCYTES ABSOLUTE: 0.4 K/UL (ref 0–1.3)
MONOCYTES RELATIVE PERCENT: 6 %
NEUTROPHILS ABSOLUTE: 4.8 K/UL (ref 1.7–7.7)
NEUTROPHILS RELATIVE PERCENT: 68.3 %
PDW BLD-RTO: 13.8 % (ref 12.4–15.4)
PLATELET # BLD: 208 K/UL (ref 135–450)
PMV BLD AUTO: 8.3 FL (ref 5–10.5)
RBC # BLD: 4.45 M/UL (ref 4–5.2)
WBC # BLD: 7 K/UL (ref 4–11)

## 2022-06-22 PROCEDURE — G8417 CALC BMI ABV UP PARAM F/U: HCPCS | Performed by: INTERNAL MEDICINE

## 2022-06-22 PROCEDURE — 99244 OFF/OP CNSLTJ NEW/EST MOD 40: CPT | Performed by: INTERNAL MEDICINE

## 2022-06-22 PROCEDURE — G8427 DOCREV CUR MEDS BY ELIG CLIN: HCPCS | Performed by: INTERNAL MEDICINE

## 2022-06-22 PROCEDURE — 1090F PRES/ABSN URINE INCON ASSESS: CPT | Performed by: INTERNAL MEDICINE

## 2022-06-22 RX ORDER — OMEGA-3/DHA/EPA/FISH OIL 300-1000MG
1 CAPSULE ORAL DAILY
COMMUNITY

## 2022-06-22 RX ORDER — GABAPENTIN 300 MG/1
300 CAPSULE ORAL NIGHTLY
Qty: 30 CAPSULE | Refills: 1 | Status: SHIPPED | OUTPATIENT
Start: 2022-06-22 | End: 2022-06-28 | Stop reason: ALTCHOICE

## 2022-06-22 NOTE — ASSESSMENT & PLAN NOTE
- EMG proven. Made referral to [facility] as above #2.  Consider Neurosurgery evaluation for her ongoing Sx.

## 2022-06-22 NOTE — PATIENT INSTRUCTIONS
After your visit with Dr. Carmelita Martinez today, please obtain all labs and imaging as ordered prior to your 6-8 week follow up visit. Please make sure to obtain all X-rays at a [facility]) facility as Dr. Carmelita Martinez personally reviews the films to make the proper diagnosis for you. If you are referred to another doctor, make sure to call the provided number to schedule an appointment for yourself. If you dont hear anything from that doctors office after a few business days, please give our office a call so we help you or reach out to them on your own if you can find their contact information    Please note:   Lab and imaging results will be discussed at follow up appointments (not over the phone or via [address] If you would like a copy of your labs before your follow up appointment, you can call the office and request for them to be mailed to you. Take pictures on your phone! Many manifestations of rheumatic disease are transient. If you take a picture of your bothersome physical finding (rash, swollen joint, ulcer, etc), we will have more information at your next appointment. Prescriptions and refills will be handled at scheduled office visits and enough medicine will be prescribed to last at least until the patients next appointment. Since we expect to fill prescriptions at the office visit, running out may be a signal that it is time to call our office to schedule an appointment. 90-day refills will be provided at [address] [place] [address] as most of our medications are high risk medications that require toxicity monitoring. It is your responsibility to schedule your follow up appointment on time to ensure that you have enough refills in between office visits. Patients with rheumatic disease are more susceptible to a variety of infections, whether or not they are on medicine to suppress their immune system.  Please discuss vaccinations with your primary care doctor,  including but not limited to:  o Influenza vaccination (yearly)  o Pneumococcal vaccinations (Prevnar 13, Pneumovax)  o Shingles vaccination (Shingrix)  o HPV vaccines (SLE patients have a higher rate of HPV infection and precancerous cervical lesions)  o TDaP vaccination  o Hepatitis B vaccination    Patients with rheumatic disease are at a higher risk for a variety of cancers. Please make sure you discuss age appropriate screening with your primary care doctor, including but not limited to:  o Breast, cervical, colon, and prostate cancer screenings  o Yearly full body skin exams with a dermatologist    Knowledge is power. There is a wealth of free patient information available on www. [email or web address]. Some articles require a subscription, so if you are unable to access an article, please let Dr. Kofi Gaston know and she can print it or e-mail the article to you. We encourage you to read as much as you can about your diagnosis and the recommended medications.

## 2022-06-22 NOTE — ASSESSMENT & PLAN NOTE
- isolated paresthesias in the bottom of b/l feet w/ symmetrical numbness in b/l 2-4th toes. I do not think this is related to an underlying rheumatologic condition including her low PASQUALE titer. Reviewed prior w/u on Care Everywhere, her EMG study of BLE on 7/30/18 showed b/l chronic lumbosacral radiculopathies. MRI of L-spine from 7/30/18 showed degenerative changes including moderate b/l facet arthropathy at L4-5 and asymmetric left lateral recess narrowing with moderate to severe left L4 foraminal narrowing.   - made referral to [facility], consider Neurosurgery evaluation. - she was prescribed low dose Gabapentin by her former PCP in the past but did not recall taking this medicine. She agreed to try Gabapentin 300 mg QHS.

## 2022-06-22 NOTE — LETTER
[facility]  [address] [place] [address]  Phone: [phone]  Fax: [phone]           Andrew Antunez MD      June 22, 2022     Patient: Phillip Cali   MR Number: [id number]   YOB: 1950   Date of Visit: 6/22/2022       Dear Dr. Eller Europe: Thank you for referring Phillip Cali to me for evaluation/treatment. Below are the relevant portions of my assessment and plan of care. If you have questions, please do not hesitate to call me. I look forward to following Emily along with you.     Sincerely,        Andrew Antunez MD    CC providers:  Cynthia Martin MD  [address] [place] [id number]  [address] [place]

## 2022-06-22 NOTE — ASSESSMENT & PLAN NOTE
- recently found to have an low titer PASQUALE of 1:80 in speckled pattern by podiatrist on 3/18/22 during w/u for paresthesias of b/l feet. Based on her clinical Hx, I do not think her current Sx are related to an underlying rheumatologic condition, refer to below #2 for her chronic paresthesias w/u. ROS positive for mild sicca otherwise unimpressive for a CTD. Reviewed prior labs on Care Everywhere, no Hx of cytopenias. - the PASQUALE test was not designed as a screening test and the [employer] of Rheumatology does not support using it as such. Up to 1/4 of healthy adults, particularly female, can have a low titer PASQUALE and also do not go in to develop rheumatic diseases. Up to 50% of elderly patients have a positive PASQUALE. PASQUALE can be positive in viral infections, those with a family history of rheumatic disease, those with other autoimmune diseases like GI or thyroid disease, cancer, as well as many other conditions. Once a patient has a positive PASQUALE, it does not need to be retested, unless symptoms change and there is an increased suspicion for a rheumatic disease.

## 2022-06-23 LAB
ANTI-CENTROMERE B IGG: 0.3 AI (ref 0–0.9)
ANTI-CHROMATIN IGG: 0.3 AI (ref 0–0.9)
ANTI-DSDNA IGG: 11 IU/ML (ref 0–9)
ANTI-JO1 IGG: <0.2 AI (ref 0–0.9)
ANTI-NUCLEAR ANTIBODY (ANA): POSITIVE
ANTI-RIBOSOMAL P IGG: <0.2 AI (ref 0–0.9)
ANTI-RNP IGG: <0.2 AI (ref 0–0.9)
ANTI-SCL70 IGG: <0.2 AI (ref 0–0.9)
ANTI-SMITH IGG: <0.2 AI (ref 0–0.9)
ANTI-SMRNP IGG: <0.2 AI (ref 0–0.9)
ANTI-SS-A IGG: <0.2 AI (ref 0–0.9)
ANTI-SS-B IGG: <0.2 AI (ref 0–0.9)

## 2022-06-24 LAB — DSDNA ANTIBODY TITER: NORMAL

## 2022-06-28 ENCOUNTER — OFFICE VISIT (OUTPATIENT)
Dept: PRIMARY CARE CLINIC | Age: 72
End: 2022-06-28
Payer: COMMERCIAL

## 2022-06-28 VITALS
TEMPERATURE: 97.8 F | SYSTOLIC BLOOD PRESSURE: 120 MMHG | DIASTOLIC BLOOD PRESSURE: 78 MMHG | BODY MASS INDEX: 27.92 KG/M2 | WEIGHT: 173 LBS | OXYGEN SATURATION: 98 % | HEART RATE: 59 BPM

## 2022-06-28 DIAGNOSIS — M17.11 ARTHRITIS OF RIGHT KNEE: Primary | ICD-10-CM

## 2022-06-28 DIAGNOSIS — R73.03 PREDIABETES: ICD-10-CM

## 2022-06-28 LAB — HBA1C MFR BLD: 6.2 %

## 2022-06-28 PROCEDURE — G8427 DOCREV CUR MEDS BY ELIG CLIN: HCPCS | Performed by: STUDENT IN AN ORGANIZED HEALTH CARE EDUCATION/TRAINING PROGRAM

## 2022-06-28 PROCEDURE — 3017F COLORECTAL CA SCREEN DOC REV: CPT | Performed by: STUDENT IN AN ORGANIZED HEALTH CARE EDUCATION/TRAINING PROGRAM

## 2022-06-28 PROCEDURE — G8400 PT W/DXA NO RESULTS DOC: HCPCS | Performed by: STUDENT IN AN ORGANIZED HEALTH CARE EDUCATION/TRAINING PROGRAM

## 2022-06-28 PROCEDURE — 20610 DRAIN/INJ JOINT/BURSA W/O US: CPT | Performed by: STUDENT IN AN ORGANIZED HEALTH CARE EDUCATION/TRAINING PROGRAM

## 2022-06-28 PROCEDURE — G8417 CALC BMI ABV UP PARAM F/U: HCPCS | Performed by: STUDENT IN AN ORGANIZED HEALTH CARE EDUCATION/TRAINING PROGRAM

## 2022-06-28 PROCEDURE — 1123F ACP DISCUSS/DSCN MKR DOCD: CPT | Performed by: STUDENT IN AN ORGANIZED HEALTH CARE EDUCATION/TRAINING PROGRAM

## 2022-06-28 PROCEDURE — 1036F TOBACCO NON-USER: CPT | Performed by: STUDENT IN AN ORGANIZED HEALTH CARE EDUCATION/TRAINING PROGRAM

## 2022-06-28 PROCEDURE — 1090F PRES/ABSN URINE INCON ASSESS: CPT | Performed by: STUDENT IN AN ORGANIZED HEALTH CARE EDUCATION/TRAINING PROGRAM

## 2022-06-28 PROCEDURE — 83036 HEMOGLOBIN GLYCOSYLATED A1C: CPT | Performed by: STUDENT IN AN ORGANIZED HEALTH CARE EDUCATION/TRAINING PROGRAM

## 2022-06-28 PROCEDURE — 99214 OFFICE O/P EST MOD 30 MIN: CPT | Performed by: STUDENT IN AN ORGANIZED HEALTH CARE EDUCATION/TRAINING PROGRAM

## 2022-06-28 RX ORDER — TRIAMCINOLONE ACETONIDE 40 MG/ML
40 INJECTION, SUSPENSION INTRA-ARTICULAR; INTRAMUSCULAR ONCE
Status: DISCONTINUED | OUTPATIENT
Start: 2022-06-28 | End: 2022-06-28

## 2022-06-28 RX ORDER — TRIAMCINOLONE ACETONIDE 40 MG/ML
40 INJECTION, SUSPENSION INTRA-ARTICULAR; INTRAMUSCULAR ONCE
Status: COMPLETED | OUTPATIENT
Start: 2022-06-28 | End: 2022-06-28

## 2022-06-28 RX ADMIN — TRIAMCINOLONE ACETONIDE 40 MG: 40 INJECTION, SUSPENSION INTRA-ARTICULAR; INTRAMUSCULAR at 10:42

## 2022-06-28 ASSESSMENT — ENCOUNTER SYMPTOMS
WHEEZING: 0
SHORTNESS OF BREATH: 0
ABDOMINAL PAIN: 0

## 2022-06-28 NOTE — PATIENT INSTRUCTIONS
Patient Education       ÒíÇÑÉ ÇáÝÍÕ ÇáØÈí¡ áÃÔÎÇÕ GEXHPUQ ÃßÈÑ ãä 72 ÚÇãðÇ: ÅÑÔÇÏÇÊ ÇáÑÚÇíÉ  Well Visit, Over 74: Care Instructions  äÙÑÉ ÚÇãÉ  íãßä Ãä ÊÓÇÚÏß ÒíÇÑÇÊ ÇáÝÍÕ Úáì ÇáÍÝÇÙ Úáì ÕÍÊß? . ÝÍÕ ØÈíÈß ÕÍÊß ÇáÚÇãÉ æÑÈãÇ ÇÞÊÑÍ ØÑÞÇ ááÚäÇíÉ ÈäÝÓß ÌíÏÇ? Natan Ren ÞÏ íæÕí ØÈíÈß ÃíÖÇ ÈÅÌÑÇÁ CQOVLBOMGW Ýí ÇáÈíÊ¡ íãßäß ÇáãÓÇÚÏÉ Ýí ÇáæÞÇíÉ ãä ÇáãÑÖ Úä ØÑíÞ ÊäÇæá ÇáØÚÇã ÇáÕÍí æããÇÑÓÉ ÇáÊãÇÑíä ÇáÑíÇÖíÉ ÈÇäÊÙÇã æÛíÑåÇ ãä ÇáÎØæÇÊ? Natan Ren ÊõÚÏ ÑÚÇíÉ ÇáãÊÇÈÚÉ ÌÒÁðÇ ÃÓÇÓíðÇ Ýí ÚáÇÌß æÓáÇãÊß. ÝÚáíß ÇáÍÑÕ Úáì ÊÑÊíÈ ÌãíÚ ãæÇÚíÏ ÒíÇÑÉ ÇáØÈíÈ NODWYRFYM ÈåÇ¡ ETVVEYUP ÈØÈíÈß ÚäÏ BRLWNLGC ãä Ãí ãÔßáÇÊ. æãä ÇáÌíÏ ÃíÖðÇ Ãä ÊÚÑÝ äÊÇÆÌ SKSLLWLJ æßÐáß GSUEGXAB ÈÞÇÆãÉ ÇáÃÏæíÉ ÇáÊí DMIXCLPA. ßíÝ ÊÚÊäí ÈäÝÓß Ýí RYLDLA¿   ÇÎÖÚ NDDPKLLMH ÇáÝÍÕ ÇáÊí ÊÞÑÑåÇ ÃäÊ æØÈíÈß? Natan Ren íÓÇÚÏ ÇáÝÍÕ Ýí ßÔÝ MKTGQQF ÞÈá ÙåæÑ Ãí ÃÚÑÇÖ? Natan Ren  ÊäÇæá ÇáÃØÚãÉ ÇáÕÍíÉ ÇÎÊÑ ÇáÝæÇßå¡ CTKOOOIEH¡ æÇáÍÈæÈ BGSMMVX¡ æÇáÈÑæÊíä¡ æãäÊÌÇÊ ÇáÃáÈÇä ÞáíáÉ ÇáÏÓã? Lahta Balls ÇáÏåæä¡ æÎÇÕÉ ÇáÏåæä ÇáãÔÈÚÉ? Mazin Zamudio ÇáãáÍ Ýí ÇáäÙÇã ÇáÛÐÇÆí? Rondal Bruins ãä VOMIMV? Natan Ren ÅÐÇ ßäÊ ÑÌáÇ¡ áÇ ÊÔÑÈ ÃßËÑ ãä ãÔÑæÈíä Ýí Çáíæã Ãæ 14 ãÔÑæÈÇ Ýí ÇáÃÓÈæÚ? Natan Ren ÅÐÇ ßäÊ ÇãÑÃÉ¡ áÇ ÊÔÑÈí ÃßËÑ ãä ãÔÑæÈ æÇÍÏ Ýí Çáíæã Ãæ 7 ãÔÑæÈÇÊ Ýí ÇáÃÓÈæÚ? Natan Ren äÙÑÇ áÃä ÇáßÍæá íÄËÑ Úáì ßÈÇÑ ÇáÓä Úáì äÍæ ãÎÊáÝ¡ ÝÞÏ ÊÑÛÈ Ýí ÇáÊÞáíá ãäåÇ? Natan Ren Ãæ ÞÏ áÇ ÊÑÛÈ Ýí ÇáÔÑÈ Úáì ÇáÅØáÇÞ? Natan Ren  ãÇÑÓ 30 ÏÞíÞÉ Úáì ÇáÃÞá ãä ÇáÊãÇÑíä ÇáÑíÇÖíÉ Ýí ãÚÙã ÃíÇã ÇáÃÓÈæÚ ÇáãÔí åæ ÎíÇÑ ÍÓä? Natan Ren ÞÏ ÊÑÛÈ ÃíÖÇ Ýí ããÇÑÓÉ ÃäÔØÉ ÃÎÑì¡ ãËá ÇáÌÑí Ãæ AOCAMTX Ãæ ÑßæÈ ÇáÏÑÇÌÇÊ Ãæ áÚÈ ÇáÊäÓ Ãæ ÇáÑíÇÖÇÊ ÇáÌãÇÚíÉ? .   ÇÈáÛ æÍÇÝÙ Úáì æÒä ÕÍí ÓíÄÏí Ðáß Åáì ÊÞáíá ÎØÑ ÇáÅÕÇÈÉ ÈÇáÚÏíÏ ãä RBJVNWC¡ ãËá ÇáÓãäÉ æÇáÓßÑí æÃãÑÇÖ ÇáÞáÈ æÇÑÊÝÇÚ ÖÛØ ÇáÏã? Natan Ren Tatiana Footman ÊÏÎä? Angel Queen íÝÇÞã ÇáÊÏÎíä ÇáãÔÇßá ÇáÕÍíÉ? Natan Ren ÅÐÇ ÇÍÊÌÊ Åáì ãÓÇÚÏÉ ááÅÞáÇÚ Úä ÇáÊÏÎíä¡ ÝÊÍÏË Åáì ØÈíÈß Úä ÈÑÇãÌ ÇáÅÞáÇÚ Úä ÇáÊÏÎíä æÇáÚÞÇÞíÑ? . ÞÏ ÊÒíÏ Êáß ãä ÝÑÕß Ýí ÇáÅÞáÇÚ Úä ÇáÊÏÎíä Åáì ÇáÃÈÏ? Natan Ren Treinta Y Dwain 2070 SCOGZBG? Irma Edison ÇáÓåá ALHJYDHKT Åáì ÇáÞáÞ æÇáÅÌåÇÏ? . ÊÚáã ÅÓÊÑÇÊíÌíÇÊ áÅÏÇÑÉ ÇáÅÌåÇÏ¡ ãËá ÇáÊäÝÓ ÇáÚãíÞ REBBCEU ÇáæÇÚí¡ æÇáÈÞÇÁ Úáì ÇÊÕÇá IKFVOUL æãÍíØß? Natan Ren  ÅÐÇ æÌÏÊ Ãäß ÊÔÚÑ ÈÇáÍÒä Ãæ ÇáíÃÓ¡ ÝÚáíß ÇáÊÍÏË ãÚ ØÈíÈß? Delayne Debar íäÝÚ ÇáÚáÇÌ? .   Úáíß ÇáÊÍÏË ãÚ ØÈíÈß ÚãÇ ÅÐÇ ßÇä ÚäÏß Ãí ÚæÇãá ÎØÑ ááÃãÑÇÖ FGTXRLXO ÌäÓíÇ? Sejal Graven íãßäß ÇáãÓÇÚÏÉ Ýí ÇáæÞÇíÉ ãä ÇáÃãÑÇÖ NBYZGAKK ÌäÓíÇ ÅÐÇ ÇäÊÙÑÊ ÞÈá ããÇÑÓÉ ÇáÌäÓ ãÚ ÔÑíß ÌÏíÏ (Ãæ ÔÑßÇÁ) ÍÊì íÌÑí ßáÇßãÇ ÇÎÊÈÇÑÇ ááßÔÝ Úä ÇáÃãÑÇÖ VTBSHYFW ÌäÓíÇ? Sejal Graven ßãÇ íÍÓä ÇÓÊÚãÇá ÇáÚÇÒá (ÇáæÇÞí ÇáÐßÑí Ãæ ÇáÃäËæí) æÅÐÇ ßäÊ ÊÞÊÕÑ Úáì ÔÑíß æÇÍÏ áÇ íãÇÑÓ ÇáÌäÓ ãÚ ÛíÑß? . WJHCOESR ãÊæÝÑÉ áÈÚÖ ÇáÃãÑÇÖ SYGAJQIZ ÌäÓíÇ? Sejal Graven  ÅÐÇ ßäÊ ÊÙä Ãäß ÞÏ ÊæÇÌå ãÔßáÉ ãÚ ÊÚÇØí TDNXNM Ãæ ÇáÚÞÇÞíÑ¡ ÝÊÍÏË Åáì ØÈíÈß? . æíÔãá Ðáß ÇáÃÏæíÉ ÇáãæÕæÝÉ ØÈíÇ (ãËá ÇáÃãÝíÊÇãíäÇÊ æÇáãæÇÏ ÇáÃÝíæäíÉ) æÇáÚÞÇÞíÑ ÛíÑ ÇáãÔÑæÚÉ (ãËá ÇáßæßÇííä æÇáãíËÇãÝíÊÇãíä)? . íãßä áØÈíÈß ãÓÇÚÏÊß Ýí ãÚÑÝÉ äæÚ ÇáÚáÇÌ ÇáÃäÓÈ áß? .   ÇÍã ÈÔÑÊß ãä ÇáÊÚÑÖ ÇáãÝÑØ áÃÔÚÉ ÇáÔãÓ ÚäÏãÇ ÊÎÑÌ ãä 10 ÕÈÇÍÇ Åáì 4 ãÓÇÁ¡ ÇãßË Ýí ÇáÙá Ãæ ÊÛØ HAZFCAXN æÇÑÊÏí ÞÈÚÉ ÐÇÊ ÍÇÝÉ æÇÓÚÉ? Mayela Mor ÇáäÙÇÑÇÊ ÇáÔãÓíÉ ÇáÊí ÊãäÚ ÇáÃÔÚÉ ÝæÞ ÇáÈäÝÓÌíÉ? . ÍÊì ÚäÏãÇ íßæä ÇáÌæ ÛÇÆãÇ¡ ÖÚ æÇÞí ÇáÔãÓ æÇÓÚ ÇáØíÝ? (SPF 30 ? Ãæ ÃÚáì? ) ? Howard Southerly ÌáÏ ãßÔæÝ? .   Úáíß ÚíÇÏÉ ØÈíÈ ÇáÃÓäÇä ãÑÉ Ãæ ãÑÊíä Ýí ÇáÓäÉ áÅÌÑÇÁ WEZKAKPR æÊäÙíÝ ÃÓäÇäß? Sejal Graven  ÇÑÊÏ ÍÒÇã ÇáÃãÇä Ýí ÇáÓíÇÑÉ? .  ãÊì íäÈÛí Úáíßö VPWPBMG áØáÈ ÇáãÓÇÚÏÉ¿  ÑÇÞÈ Úä ßËÈ ÇáÊÛííÑÇÊ ÇáÊí ÊÍÏË Ýí ÕÍÊß¡ æÇÍÑÕ Úáì ÇáÇÊÕÇá ÈÇáØÈíÈ ÅÐÇ ßäÊ ÊÚÇäí ãä Ãí ãÔÇßá Ãæ ÃÚÑÇÖ ÊËíÑ ÞáÞß. Ãíä íãßäß ãÚÑÝÉ ÇáãÒíÏ¿  ÇäÊÞÇá Åáì   [email or web address]  [address] Ýí ãÑÈÚ ÇáÈÍË áãÚÑÝÉ ÇáãÒíÏ Íæá \"ÒíÇÑÉ ÇáÝÍÕ ÇáØÈí¡ áÃÔÎÇÕ ÃÚãÇÑåã ÃßÈÑ ãä 72 ÚÇãðÇ: ÅÑÔÇÏÇÊ ÇáÑÚÇíÉ. \"  ÓÇÑò HPXCAVMZ ãä: 9 ÂÐÇÑ 2022               äÓÎÉ ISPTUMC: 13.3  © 3223-7645 [employer]. Êã ÊÚÏíá ÅÑÔÇÏÇÊ ÇáÑÚÇíÉ ÈãæÌÈ ÊÑÎíÕ ÕÇÏÑ ãä ÇÎÊÕÇÕí ÇáÑÚÇíÉ ÇáÕÍíÉ ÇáÎÇÕ Èß. ÅÐÇ ßÇäÊ áÏíß ÃÓÆáÉ RRAVI ÈÍÇáÉ ãÑÖíÉ Ãæ ÈåÐå ÇáÊÚáíãÇÊ¡ ÝÇÍÑÕ Úáì ÇáÑÌæÚ ÏÇÆãðÇ Åáì ÇÎÊÕÇÕí ÇáÑÚÇíÉ ÇáÕÍíÉ. ÊõÎáí ÔÑßÉ [employer] [id number] Úä Ãí ÖãÇä Ãæ ÇáÊÒÇã íÊÚáÞ [id number] áåÐå [id number].

## 2022-06-28 NOTE — PROGRESS NOTES
Patient:  Arnol Hudson 73 y.o. female     Date of Service: 6/28/2022       Chief complaint:   Chief Complaint   Patient presents with    Other     prediabetes follow up    Knee Pain     right knee requesting cortisone        History of Present Illness   Patient presents today for follow-up and with complaints of right knee pain. She is accompanied by his son. Prediabetes: Diagnosed several years ago. She has not been on any medication. She checks blood sugar at home with variable results (fasting ranging between 100 and 150s). HbA1c today is stable at 6.2.     Moles on the scalp, s/p excision: Was seen by dermatologist and diagnosed with skin cancer (either BCC or SCC, son does not remember). These have been excised.     Burning sensation bilateral feet: Started about 10 years ago. She was referred to podiatrist, who in turn order for some blood tests. Due to positive PASQUALE, patient was referred to rheumatologist.  According to rheumatologist, her symptoms are very unlikely from rheumatological disease (still waiting on further blood work results). Patient was recommended to see a pain specialist or consider neurosurgery consultation. However she reports that her symptoms are not significant enough for her to seek further treatment. Right knee pain: Hx or chronic arthritis. She underwent knee replacement surgery for her left knee. She wants to hold off surgery of her right knee and requesting for intra-articular steroid injection. [employer]:   Review of Systems   Constitutional: Negative for fever. Respiratory: Negative for shortness of breath and wheezing. Cardiovascular: Negative for chest pain and palpitations. Gastrointestinal: Negative for abdominal pain.    Musculoskeletal:        Knee pain       Physical Exam   Vitals: /78   Pulse 59   Temp 97.8 °F (36.6 °C)   Wt 173 lb (78.5 kg)   SpO2 98%   BMI 27.92 kg/m²   Physical Exam  Constitutional: Appearance: Normal appearance. Cardiovascular:      Rate and Rhythm: Normal rate and regular rhythm. Pulses: Normal pulses. Heart sounds: Normal heart sounds. Pulmonary:      Effort: Pulmonary effort is normal.      Breath sounds: Normal breath sounds. Musculoskeletal:      Comments: Tenderness to palpation along with right knee joint line. Crepitus with range of motion. No swelling appreciated. Neurological:      Mental Status: She is alert and oriented to person, place, and time. Psychiatric:         Mood and Affect: Mood normal.         Behavior: Behavior normal.            Results for POC orders placed in visit on 06/28/22   POCT glycosylated hemoglobin (Hb A1C)   Result Value Ref Range    Hemoglobin A1C 6.2 %       Assessment and Plan   1. Arthritis of right knee  Assessment & Plan:   Patient wants to go for trial of steroid injection this time and put of referral to Ortho for now. Orders:  -     Handicap Placard MISC; Starting Tue 6/28/2022, Disp-1 each, R-0, PrintDuration: 5 years  -     VT ARTHROCENTESIS ASPIR&/INJ MAJOR JT/BURSA W/O US  -     triamcinolone acetonide (KENALOG-40) injection 40 mg; 40 mg, Intra-artICUlar, ONCE, 1 dose, On Tue 6/28/22 at 1100  2. Prediabetes  Assessment & Plan:   Stable. Continue with lifestyle modification. Orders:  -     POCT glycosylated hemoglobin (Hb A1C)      Joint injection (Right knee)    Indications and potential risks and complications of the procedure were explained to the patient. Patient was informed that there would be some pain associated with the procedure, and that the injection might not relieve the symptoms. In addition, possible side effects, including increased pain, infection, or bleeding could result from the injection. Patient also told that there are limitations on the frequency and amount of injections in any joint. Questions were answered and the patient agreed to the procedure. Sterile technique was employed.   1 cc of triamcinolone (KENALOG) 40mg/ml with 5 cc of 2% Lidocaine with epinephrine injected into knee. Patient tolerated procedure well. Bandaid applied and instructions given. Patient told to use ice for worsening pain and avoid forceful or strenuous use of affected area for 1-2 weeks. Patient may take OTC analgesics or usual pain medication for further relief. Issues to address at future visit/s:      Return to Office: Return in about 4 months (around 10/28/2022) for follow up / knee injection. Medication List:    Current Outpatient Medications   Medication Sig Dispense Refill    Handicap Placard MISC by Does not apply route Duration: 5 years 1 each 0    fish oil-omega-3 fatty acids 1000 MG capsule Take 1 capsule by mouth daily       No current facility-administered medications for this visit. Electronically signed by Tato Madrigal MD on 6/28/2022 at 10:52 AM     This dictation was generated by voice recognition computer software. Although all attempts are made to edit the dictation for accuracy, there may be errors in the transcription that are not intended.

## 2022-06-30 LAB
ANA PATTERN: ABNORMAL
ANA TITER: ABNORMAL
ANTINUCLEAR AB INTERPRETIVE COMMENT: ABNORMAL
ANTINUCLEAR ANTIBODY, HEP-2, IGG: DETECTED

## 2022-08-09 ENCOUNTER — TELEPHONE (OUTPATIENT)
Dept: PRIMARY CARE CLINIC | Age: 72
End: 2022-08-09

## 2022-08-09 NOTE — TELEPHONE ENCOUNTER
----- Message from Tiffanie Pleas sent at 8/9/2022 10:58 AM EDT -----  Subject: Message to Provider    QUESTIONS  Information for Provider? Mother is a pre diabetic, was wondering if there   was anyway she could be prescribed a prescription for the lancets and   machines to check her blood sugar   ---------------------------------------------------------------------------  --------------  Melisa Maldonado INFO  [phone]; OK to leave message on voicemail  ---------------------------------------------------------------------------  --------------  SCRIPT ANSWERS  Relationship to Patient? Other  Representative Name? Ariella   Is the Representative on the appropriate HIPAA document in Epic?  Yes

## 2022-08-09 NOTE — TELEPHONE ENCOUNTER
Spoke with Ariella, - pt has [employer] and a glucometer (and supplies) will not be covered with a prediabetes diagnosis. I advised patient the cheapest route would be to pay for one out of pocket. Pt's son verbalized understanding. No further action is required for this encounter.

## 2022-08-12 ENCOUNTER — OFFICE VISIT (OUTPATIENT)
Dept: PAIN MANAGEMENT | Age: 72
End: 2022-08-12
Payer: COMMERCIAL

## 2022-08-12 VITALS
WEIGHT: 172.8 LBS | HEART RATE: 63 BPM | BODY MASS INDEX: 27.89 KG/M2 | SYSTOLIC BLOOD PRESSURE: 139 MMHG | OXYGEN SATURATION: 97 % | DIASTOLIC BLOOD PRESSURE: 76 MMHG

## 2022-08-12 DIAGNOSIS — M54.17 LUMBOSACRAL RADICULOPATHY: Primary | ICD-10-CM

## 2022-08-12 DIAGNOSIS — G89.4 CHRONIC PAIN SYNDROME: ICD-10-CM

## 2022-08-12 DIAGNOSIS — G57.92 MONONEUROPATHY OF LEFT LOWER EXTREMITY: ICD-10-CM

## 2022-08-12 DIAGNOSIS — G57.91 MONONEUROPATHY OF RIGHT LOWER EXTREMITY: ICD-10-CM

## 2022-08-12 DIAGNOSIS — Z51.81 ENCOUNTER FOR THERAPEUTIC DRUG MONITORING: ICD-10-CM

## 2022-08-12 DIAGNOSIS — M47.817 LUMBOSACRAL SPONDYLOSIS WITHOUT MYELOPATHY: ICD-10-CM

## 2022-08-12 PROCEDURE — G8427 DOCREV CUR MEDS BY ELIG CLIN: HCPCS | Performed by: NURSE PRACTITIONER

## 2022-08-12 PROCEDURE — 1090F PRES/ABSN URINE INCON ASSESS: CPT | Performed by: NURSE PRACTITIONER

## 2022-08-12 PROCEDURE — 99244 OFF/OP CNSLTJ NEW/EST MOD 40: CPT | Performed by: NURSE PRACTITIONER

## 2022-08-12 PROCEDURE — G8417 CALC BMI ABV UP PARAM F/U: HCPCS | Performed by: NURSE PRACTITIONER

## 2022-08-12 RX ORDER — GABAPENTIN 100 MG/1
CAPSULE ORAL
Qty: 90 CAPSULE | Refills: 1 | Status: SHIPPED | OUTPATIENT
Start: 2022-08-12 | End: 2022-09-01

## 2022-08-12 RX ORDER — ASPIRIN 81 MG/1
81 TABLET ORAL DAILY
COMMUNITY

## 2022-08-12 ASSESSMENT — ENCOUNTER SYMPTOMS
WHEEZING: 0
ABDOMINAL PAIN: 0
SHORTNESS OF BREATH: 0
BACK PAIN: 1

## 2022-08-12 NOTE — PROGRESS NOTES
Sukhjinder Coombs MD  [address]  [facility] - Rheumatology    [x] [facility]:  [facility]  [address] [facility] [] [facility]:  [address]   Office: [phone]  Fax: [phone]     RHEUMATOLOGY PROGRESS NOTE    ASSESSMENT/PLAN:  Morena Reyes is a 81 y.o. female w/ chronic paresthesias of the feet found to have a positive PASQUALE. She has degenerative arthritis of the lumbar spine w/ EMG proven b/l lumbosacral radiculopathies. PMHx includes prediabetes. Current rheum meds:  Gabapentin 300 mg QHS     Past rheum meds:  Naproxen: ineffective  Gabapentin 100 mg TID    1. PASQUALE positive  Assessment & Plan:  - recently found to have an low titer PASQUALE of 1:80 in speckled pattern by podiatrist on 3/18/22 during w/u for paresthesias of b/l feet. Based on her clinical Hx, I do not think her current Sx are related to an underlying rheumatologic condition, refer to below #2 for her chronic paresthesias w/u. The PASQUALE test was not designed as a screening test and the [employer] of Rheumatology does not support using it as such. Up to 1/4 of healthy adults, particularly female, can have a low titer PASQUALE and also do not go in to develop rheumatic diseases. Up to 50% of elderly patients have a positive PASQUALE. PASQUALE can be positive in viral infections, those with a family history of rheumatic disease, those with other autoimmune diseases like GI or thyroid disease, cancer, as well as many other conditions. Once a patient has a positive PASQUALE, it does not need to be retested, unless symptoms change and there is an increased suspicion for a rheumatic disease.  - discussed repeat serologies from 6/22/22 which do not indicate an active CTD such as SLE. No further w/u is indicated for her positive PASQUALE at this point. 2. History of recurrent miscarriages  Assessment & Plan:  - pt reported a Hx of 3 miscarriages between 2-4 months gestation age of unclear etiology.  Denied any Hx of blood clots. - ordered APL Abs, pt declined testing. 3. Paresthesia of foot, bilateral  Assessment & Plan:  - isolated paresthesias in the bottom of b/l feet w/ symmetrical numbness in b/l 2-4th toes. I do not think this is related to an underlying rheumatologic condition. Reviewed prior w/u on Care Everywhere, her EMG study of BLE on 7/30/18 showed b/l chronic lumbosacral radiculopathies. MRI of L-spine from 7/30/18 showed degenerative changes including moderate b/l facet arthropathy at L4-5 and asymmetric left lateral recess narrowing with moderate to severe left L4 foraminal narrowing.   - she has established care w/ [facility] who prescribed her Gabapentin. 4. Lumbosacral radiculopathy  Assessment & Plan:  - EMG proven. - she has established care w/ [facility] as above #2. Return if symptoms worsen or fail to improve. The risks and benefits of my recommendations, as well as other treatment options, benefits and side effects were discussed with the patient today. Questions were answered. NOTE: This report is transcribed by using voice recognition software dragon. Every effort is made to ensure accuracy; however, inadvertent computerized  transcription errors may be present. SUBJECTIVE:  Past medical/surgical history, medications and allergies are reviewed and updated as appropriate. Interval Hx:   Pt returns to the office w/ her son to discuss her Rheumatology w/u today. She was prescribed Gabapentin 300 mg QHS by Rheumatology for paresthesias in her feet at her initial visit on 6/22/22. She recently saw Pain Management nurse practitioner on 8/12/22 for lumbosacral radiculopathy who prescribed her Gabapentin again. Pt states this has not alleviated any of her Sx. She cont to have numbness and burning pain in the bottom of her feet. No new symptoms.      ROS:  Constitutional: denies chronic fatigue, fever/chills, night sweats, unintentional weight synovitis in the MCP, PIP, or DIP joints b/l, no dactylitis, able to make strong full fists, no evidence of sclerodactyly, calcinosis, digital ulcers or pitting              Wrist: no synovitis in the wrist joints b/l, FROM              Elbow: no synovitis or bursitis, FROM  Lower extremities:              Knees: no warmth or effusion present, FROM              Ankles: no synovitis, FROM, Achilles tendons w/o swelling or warmth NTTP              Feet: no toe swelling or pain or warmth on palpation w/ FROM, negative MTP squeeze test  INTEGUMENT: no rash or psoriatic lesions, no petechiae, bruises, or palpable purpura, no patchy alopecia, no nail pitting or periungual changes, no clubbing or digital ulcers    DATA:  Labs:   I personally reviewed interval labs and discussed w/ the pt in detail which showed:    Lab Results   Component Value Date    WBC 7.0 06/22/2022    HGB 13.6 06/22/2022    HCT 40.3 06/22/2022    MCV 90.6 06/22/2022     06/22/2022    LYMPHOPCT 22.4 06/22/2022    RBC 4.45 06/22/2022    MCH 30.7 06/22/2022    MCHC 33.8 06/22/2022    RDW 13.8 06/22/2022     Lab Results   Component Value Date     03/07/2022    K 4.1 03/07/2022     03/07/2022    CO2 17 (L) 03/07/2022    BUN 14 03/07/2022    CREATININE 0.6 06/22/2022    GLUCOSE 107 (H) 03/07/2022    CALCIUM 9.7 03/07/2022    PROT 7.2 03/07/2022    LABALBU 4.6 03/07/2022    BILITOT 0.3 03/07/2022    ALKPHOS 68 03/07/2022    AST 19 03/07/2022    ALT 16 03/07/2022    LABGLOM >60 06/22/2022    GFRAA >60 06/22/2022    AGRATIO 1.8 03/07/2022     Lab Results   Component Value Date    COLORU YELLOW 03/07/2022    CLARITYU Clear 03/07/2022    GLUCOSEU Negative 03/07/2022    BILIRUBINUR Negative 03/07/2022    KETUA Negative 03/07/2022    SPECGRAV 1.008 03/07/2022    BLOODU Negative 03/07/2022    PHUR 6.0 03/07/2022    PROTEINU Negative 03/07/2022    UROBILINOGEN 0.2 03/07/2022    NITRU Negative 03/07/2022    LEUKOCYTESUR Negative 03/07/2022 narrowing. L4-5: Disc osteophyte complex with left subarticular/foraminal disc protrusion and moderate bilateral facet arthropathy. Asymmetric left lateral recess narrowing with moderate to severe left L4 foraminal narrowing. The right foramen is moderately narrowed. L5-S1: No significant central canal stenosis or neural foraminal narrowing. Axial imaging was not performed through this level. IMPRESSION:   Degenerative changes of the lumbar spine as outlined above, most pronounced at L4-5. Transitional anatomy with sacralization of L5 vertebral body. DEXA study (11/29/18):  T-score 0.2 in L1-L4. T-score 1.2 in the L total hip. T-score 0.5 in the L femoral neck. T-score 2.4 in the R total hip. T-score 1.1 in the R femoral neck. Procedures:  EMG of BLE (7/30/18): Summary:  The right fibular compound muscle action potential amplitude was low. Otherwise, the nerve conduction studies of the legs were normal. The needle examination showed mildly enlarged motor unit potentials in the right vastus lateralis and biceps femoris muscles and left anterior tibial and medial gastrocnemius muscles. Sparse fibrillation was seen in the right lumbosacral paraspinal muscles. Otherwise the needle exam was normal. Note: A new pre-sterilized needle electrode was used for the examination and discarded afterwards. Interpretation:  Abnormal EMG. There is evidence of mild, bilateral chronic lumbosacral radiculopathies with minimal evidence of ongoing denervation. The normal sural responses argue against the presence of a distal symmetric neuropathy. Above results were discussed w/ the pt in detail during today's visit.

## 2022-08-12 NOTE — PROGRESS NOTES
HISTORY OF PRESENT ILLNESS:  Ms. Lauri Marie is a 81 y.o. female presents for consultation at the request of Dr. Semaj Schmidt. Her presenting problems are cody feet pain- L>R, mild low back pain. She has also been evaluated by PCP and neurology several years ago. Onset of pain began about 6 years ago. She rates the pain 6/10 and describes it as burning, pins and needles. Pain is greaterin her left foot than her right foot and low back. Pain is made worse by: movement, walking, standing. Activities that have been limited by pain that she otherwise tolerated well are daily house work, ADLs. Alternative therapies she has previously attempted are vitamin supplement regimen. Current treatment regimen has helped relieve about 10% of the pain. Relieving factors of pain include warm water soaks on the feet. Shedenies side effects from the current pain regimen. Patient reports mood is well and happy and sleep patterns are Good. Patient deniesneurological bowel or bladder. Patient denies misusing/abusing her narcotic pain medications or using any illegal drugs. she denies morning stiffness, fatigue, and headaches. Patient reports history of being treated with neurology at [facility] in 2018. She did have an EMG done that suggested lumbar radiculopathies. She reports mild back pain, worse pain is on her left foot under her toes near that fat pad. She reports in the middle the night she will be awoken by burning spasms in that foot. Patient is leaving to go home in 4 weeks and will be there for 5 months. She is accompanied by her son today, we did use an  with some information during the conversation from her son as well. She has had a history of a positive PASQUALE lab result as well. She denies other pains. No history of trying gabapentin or any neuropathic agents for her pain. ROS:  Review of Systems   Constitutional:  Negative for fatigue, fever and unexpected weight change.    HENT:  Negative for congestion and Coordination normal.      Gait: Gait normal.      Deep Tendon Reflexes: Reflexes normal.   Psychiatric:         Mood and Affect: Mood normal.         Behavior: Behavior normal.         Thought Content: Thought content normal.         Judgment: Judgment normal.      /76   Pulse 63   Wt 172 lb 12.8 oz (78.4 kg)   SpO2 97%   BMI 27.89 kg/m²      ASSESSMENT:    1. Lumbosacral radiculopathy    2. Chronic pain syndrome    3. Lumbosacral spondylosis without myelopathy    4. Mononeuropathy of right lower extremity    5. Mononeuropathy of left lower extremity         Lab Results   Component Value Date    WBC 7.0 06/22/2022    HGB 13.6 06/22/2022    HCT 40.3 06/22/2022    MCV 90.6 06/22/2022     06/22/2022     Lab Results   Component Value Date/Time     03/07/2022 02:46 PM    K 4.1 03/07/2022 02:46 PM     03/07/2022 02:46 PM    CO2 17 03/07/2022 02:46 PM    BUN 14 03/07/2022 02:46 PM    CREATININE 0.6 06/22/2022 09:00 AM    GLUCOSE 107 03/07/2022 02:46 PM    CALCIUM 9.7 03/07/2022 02:46 PM      Lab Results   Component Value Date    TSHFT4 1.20 03/07/2022       IMAGING:  Omer Melgoza MD     8/2/2018  9:16 PM     Electrodiagnostic Laboratory     Nerve Conduction & EMG Report            Patient: Donald Mix YOB: 1950   Test Date: 07/30/18 Age: 79 Years 0 Months   Ref:   Candace Alonso MD     Indication for Referral:  The patient was evaluated for bilateral   lower extremity mononeuropathies and bilateral lumbosacral   radiculopathies. Summary:  The right fibular compound muscle action potential   amplitude was low. Otherwise, the nerve conduction studies of the   legs were normal. The needle examination showed mildly enlarged   motor unit potentials in the right vastus lateralis and biceps   femoris muscles and left anterior tibial and medial gastrocnemius   muscles. Sparse fibrillation was seen in the right lumbosacral   paraspinal muscles.  Otherwise the needle exam was normal.  Note:   A new pre-sterilized needle electrode was used for the   examination and discarded afterwards. Interpretation:  Abnormal EMG. There is evidence of mild,   bilateral chronic lumbosacral radiculopathies with minimal   evidence of ongoing denervation. The normal sural responses argue   against the presence of a distal symmetric neuropathy. Nima Villalba M.D. Electronically proofed and signed. EXAM: MRI LUMBAR SPINE WITHOUT CONTRAST . INDICATION:  Spinal stenosis, lumbosacral region;     TECHNIQUE: MRI LUMBAR SPINE WITHOUT CONTRAST obtained including multiplanar T1 and T2 weighted imaging. COMPARISON: Plain film study from 7/20/2018     FINDINGS:     Comparison to plain film study demonstrates a 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 Interosseous well-circumscribed lesions are noted at the T11, L2, L3 and S1 vertebral bodies which are bright on both T2 and T1-weighted imaging consistent with benign hemangiomas. Straightening of the normal lordosis. Desiccation of the intervertebral disc signal throughout the lumbar spine with loss of intervertebral disc height most pronounced at L4-5. T12-L1: Mild facet arthropathy. No significant central canal stenosis or neural foraminal narrowing. L1-2: Mild disc bulge but no significant central canal stenosis or neural foraminal narrowing. L2-3: Small left subarticular and foraminal disc protrusion and mild bilateral facet arthropathy. No significant central canal stenosis. Mild left neural foraminal narrowing. L3-4: Disc bulge with a small left lamina protrusion encroaching on the lateral recess causing mild left neural foraminal narrowing. Mild facet arthropathy. No significant central canal stenosis or right neural foraminal narrowing.      L4-5: Disc osteophyte complex with left subarticular/foraminal disc protrusion and moderate bilateral facet arthropathy. Asymmetric left lateral recess narrowing with moderate to severe left L4 foraminal narrowing. The right foramen is moderately narrowed. L5-S1: No significant central canal stenosis or neural foraminal narrowing. Axial imaging was not performed through this level. PLAN:   -Chronic opiate treatment protocol was discussed withthe patient, informed consent was obtained. -Treatment guidelines were discussed and established  -Risks and benefits of narcotics were addressedwith the patient  - Obtainable long term and short term goals of opioid therapy were reviewed, including pain relief, sleep, psychosocial and physical functioning   -CBT techniques- relaxation therapies such as biofeedback, mindfulness based stress reduction, imagery, cognitive restructuring, problem solving discussed with patient   -Obtain urine Toxicology today  -SOAPP score:0  -ORT:0  -PHQ-9:0  -gabapentin 100mg One tab hs 1 week, 2 tabs hs 1 week, 1 tab am 2 tabs pm   -ZTLido patches in office today   -F/U 3 weeks, she is going home in 4 weeks for 5 months. If gabapentin not helping consider lyrica vs tramadol. Controlled Substances Monitoring:   [place] record was obtained and reviewed  for the last one year and no indicators of drug misuse  were found. Any other controlled substance prescriptions  seen on the record have been accounted for, I am aware of the patient receiving these medications. [place] record will be rechecked as part of office protocol.   No opiate on One AMAURY Cohen

## 2022-08-17 ENCOUNTER — OFFICE VISIT (OUTPATIENT)
Dept: RHEUMATOLOGY | Age: 72
End: 2022-08-17
Payer: COMMERCIAL

## 2022-08-17 VITALS — HEIGHT: 66 IN | BODY MASS INDEX: 27.64 KG/M2 | WEIGHT: 172 LBS

## 2022-08-17 DIAGNOSIS — R76.8 ANA POSITIVE: Primary | ICD-10-CM

## 2022-08-17 DIAGNOSIS — N96 HISTORY OF RECURRENT MISCARRIAGES: ICD-10-CM

## 2022-08-17 DIAGNOSIS — R20.2 PARESTHESIA OF FOOT, BILATERAL: ICD-10-CM

## 2022-08-17 DIAGNOSIS — M54.17 LUMBOSACRAL RADICULOPATHY: ICD-10-CM

## 2022-08-17 PROCEDURE — 1123F ACP DISCUSS/DSCN MKR DOCD: CPT | Performed by: INTERNAL MEDICINE

## 2022-08-17 PROCEDURE — G8427 DOCREV CUR MEDS BY ELIG CLIN: HCPCS | Performed by: INTERNAL MEDICINE

## 2022-08-17 PROCEDURE — G8417 CALC BMI ABV UP PARAM F/U: HCPCS | Performed by: INTERNAL MEDICINE

## 2022-08-17 PROCEDURE — 1036F TOBACCO NON-USER: CPT | Performed by: INTERNAL MEDICINE

## 2022-08-17 PROCEDURE — G8400 PT W/DXA NO RESULTS DOC: HCPCS | Performed by: INTERNAL MEDICINE

## 2022-08-17 PROCEDURE — 3017F COLORECTAL CA SCREEN DOC REV: CPT | Performed by: INTERNAL MEDICINE

## 2022-08-17 PROCEDURE — 99214 OFFICE O/P EST MOD 30 MIN: CPT | Performed by: INTERNAL MEDICINE

## 2022-08-17 PROCEDURE — 1090F PRES/ABSN URINE INCON ASSESS: CPT | Performed by: INTERNAL MEDICINE

## 2022-08-17 NOTE — ASSESSMENT & PLAN NOTE
- isolated paresthesias in the bottom of b/l feet w/ symmetrical numbness in b/l 2-4th toes. I do not think this is related to an underlying rheumatologic condition. Reviewed prior w/u on Care Everywhere, her EMG study of BLE on 7/30/18 showed b/l chronic lumbosacral radiculopathies. MRI of L-spine from 7/30/18 showed degenerative changes including moderate b/l facet arthropathy at L4-5 and asymmetric left lateral recess narrowing with moderate to severe left L4 foraminal narrowing.   - she has established care w/ [facility] who prescribed her Gabapentin.

## 2022-08-17 NOTE — ASSESSMENT & PLAN NOTE
- pt reported a Hx of 3 miscarriages between 2-4 months gestation age of unclear etiology. Denied any Hx of blood clots. - ordered APL Abs, pt declined testing.

## 2022-08-17 NOTE — ASSESSMENT & PLAN NOTE
- recently found to have an low titer PASQUALE of 1:80 in speckled pattern by podiatrist on 3/18/22 during w/u for paresthesias of b/l feet. Based on her clinical Hx, I do not think her current Sx are related to an underlying rheumatologic condition, refer to below #2 for her chronic paresthesias w/u. The PASQUALE test was not designed as a screening test and the [employer] of Rheumatology does not support using it as such. Up to 1/4 of healthy adults, particularly female, can have a low titer PASQUALE and also do not go in to develop rheumatic diseases. Up to 50% of elderly patients have a positive PASQUALE. PASQUALE can be positive in viral infections, those with a family history of rheumatic disease, those with other autoimmune diseases like GI or thyroid disease, cancer, as well as many other conditions. Once a patient has a positive PASQUALE, it does not need to be retested, unless symptoms change and there is an increased suspicion for a rheumatic disease.  - discussed repeat serologies from 6/22/22 which do not indicate an active CTD such as SLE. No further w/u is indicated for her positive PASQUALE at this point.

## 2022-08-17 NOTE — ASSESSMENT & PLAN NOTE
- EMG proven. - she has established care w/ [facility] as above #2.

## 2022-09-01 ENCOUNTER — OFFICE VISIT (OUTPATIENT)
Dept: PAIN MANAGEMENT | Age: 72
End: 2022-09-01
Payer: COMMERCIAL

## 2022-09-01 VITALS
SYSTOLIC BLOOD PRESSURE: 133 MMHG | WEIGHT: 176.6 LBS | BODY MASS INDEX: 28.5 KG/M2 | OXYGEN SATURATION: 98 % | HEART RATE: 58 BPM | DIASTOLIC BLOOD PRESSURE: 75 MMHG

## 2022-09-01 DIAGNOSIS — G89.4 CHRONIC PAIN SYNDROME: ICD-10-CM

## 2022-09-01 DIAGNOSIS — M47.817 LUMBOSACRAL SPONDYLOSIS WITHOUT MYELOPATHY: ICD-10-CM

## 2022-09-01 DIAGNOSIS — Z51.81 ENCOUNTER FOR THERAPEUTIC DRUG MONITORING: ICD-10-CM

## 2022-09-01 DIAGNOSIS — M54.17 LUMBOSACRAL RADICULOPATHY: ICD-10-CM

## 2022-09-01 DIAGNOSIS — G57.92 MONONEUROPATHY OF LEFT LOWER EXTREMITY: ICD-10-CM

## 2022-09-01 DIAGNOSIS — G57.91 MONONEUROPATHY OF RIGHT LOWER EXTREMITY: ICD-10-CM

## 2022-09-01 PROCEDURE — G8417 CALC BMI ABV UP PARAM F/U: HCPCS | Performed by: NURSE PRACTITIONER

## 2022-09-01 PROCEDURE — 1090F PRES/ABSN URINE INCON ASSESS: CPT | Performed by: NURSE PRACTITIONER

## 2022-09-01 PROCEDURE — G8427 DOCREV CUR MEDS BY ELIG CLIN: HCPCS | Performed by: NURSE PRACTITIONER

## 2022-09-01 PROCEDURE — G8400 PT W/DXA NO RESULTS DOC: HCPCS | Performed by: NURSE PRACTITIONER

## 2022-09-01 PROCEDURE — 3017F COLORECTAL CA SCREEN DOC REV: CPT | Performed by: NURSE PRACTITIONER

## 2022-09-01 PROCEDURE — 1123F ACP DISCUSS/DSCN MKR DOCD: CPT | Performed by: NURSE PRACTITIONER

## 2022-09-01 PROCEDURE — 1036F TOBACCO NON-USER: CPT | Performed by: NURSE PRACTITIONER

## 2022-09-01 PROCEDURE — 99214 OFFICE O/P EST MOD 30 MIN: CPT | Performed by: NURSE PRACTITIONER

## 2022-09-01 RX ORDER — TRAMADOL HYDROCHLORIDE 50 MG/1
50 TABLET ORAL EVERY 6 HOURS PRN
Qty: 120 TABLET | Refills: 1 | Status: SHIPPED | OUTPATIENT
Start: 2022-09-01 | End: 2022-10-01

## 2022-09-01 NOTE — PROGRESS NOTES
Emily Bustos  1950  [id number]      HISTORY OF PRESENT ILLNESS: Ms. Pietro Arteaga is a 81 y.o. female returns for a follow up visit for pain management  She has a diagnosis of   1. Lumbosacral radiculopathy    2. Lumbosacral spondylosis without myelopathy    3. Mononeuropathy of left lower extremity    4. Chronic pain syndrome    5. Mononeuropathy of right lower extremity    6. Encounter for therapeutic drug monitoring    . As per Information Obtained from the PADT (Patient Assessment and Documentation Tool)    She complains of pain in the  low back and cody feet. She rates the pain 5/10 and describes it as sharp, burning. Current treatment regimen has helped relieve about 20% of the pain. She denies any side effects from the current pain regimen. Patient reports that since the last follow up visit the physical functioning is unchanged, family/social relationships are unchanged, mood is unchanged sleep patterns are unchanged, and that the overall functioning is unchanged. Patient denies misusing/abusing her narcotic pain medications or using any illegal drugs. Upon obtaining medical history from Ms. Bustos    ALLERGIES: Patients list of allergies were reviewed     MEDICATIONS: Ms. Pietro Arteaga list of medications were reviewed. Her current medications are   Outpatient Medications Prior to Visit   Medication Sig Dispense Refill    Multiple Vitamins-Minerals (MULTIVITAMIN ADULTS PO) Take by mouth      aspirin 81 MG EC tablet Take 81 mg by mouth in the morning.      gabapentin (NEURONTIN) 100 MG capsule One tab hs 1 week, 2 tabs hs 1 week, 1 tab am 2 tabs pm 90 capsule 1    Handicap Placard MISC by Does not apply route Duration: 5 years 1 each 0    fish oil-omega-3 fatty acids 1000 MG capsule Take 1 capsule by mouth daily       No facility-administered medications prior to visit. SOCIAL/FAMILY/PAST MEDICAL HISTORY: Ms. Martinez Look, family and past medical history was reviewed.      REVIEW OF SYSTEMS: Respiratory: Negative for apnea, chest tightness and shortness of breath or change in baseline breathing. Gastrointestinal: Negative for nausea, vomiting, abdominal pain, diarrhea, constipation, blood in stool and abdominal distention. PHYSICAL EXAM:   Nursing note and vitals reviewed. /75   Pulse 58   Wt 176 lb 9.6 oz (80.1 kg)   SpO2 98%   BMI 28.50 kg/m²   Constitutional: She appears well-developed and well-nourished. No acute distress. Skin: Skin is warm and dry, good turgor. No rash noted. She is not diaphoretic. Cardiovascular: Normal rate, regular rhythm, normal heart sounds, and does not have murmur. Pulmonary/Chest: Effort normal. No respiratory distress. She does not have wheezes in the lung fields. She has no rales. Neurological/Psychiatric:She is alert and oriented to person, place, and time. Coordination is  normal.  Her mood isAppropriate and affect is Neutral/Euthymic(normal) . Prescription pain medication monitoring:                  MEDD current = 0              ORT Score = 0              Other Risk factors - stable mood              Date of Last Medication Agreement:08/12/2022              Date Naloxone prescribed:NA              UDT:                          Date of last UDT: 08/12/2022                          Adverse report: No               [place]ARRS:                          Checked today: Yes                          Adverse report: No    IMPRESSION:   1. Lumbosacral radiculopathy    2. Lumbosacral spondylosis without myelopathy    3. Mononeuropathy of left lower extremity    4. Chronic pain syndrome    5. Mononeuropathy of right lower extremity    6. Encounter for therapeutic drug monitoring        PLAN:  Informed verbal consent was obtained:  -Gabapentin gave her no relief, will discontinue  -Patient is going out of town with family for 5 months. Will try tramadol and give #120 tabs for 2 months. She will take 1-2 tabs per day and use PRN.  She is leaving in one week. D/w patient and son she can return for follow up before she leaves if tramadol is not helping. -F/U in January as needed    Analgesic Plan:              Continue present regimen:no              Adjust dose of present analgesic:yes              Switch analgesics:yes              Add/Adjust concomitant therapy:yes      Current Outpatient Medications   Medication Sig Dispense Refill    Multiple Vitamins-Minerals (MULTIVITAMIN ADULTS PO) Take by mouth      aspirin 81 MG EC tablet Take 81 mg by mouth in the morning.      gabapentin (NEURONTIN) 100 MG capsule One tab hs 1 week, 2 tabs hs 1 week, 1 tab am 2 tabs pm 90 capsule 1    Handicap Placard MISC by Does not apply route Duration: 5 years 1 each 0    fish oil-omega-3 fatty acids 1000 MG capsule Take 1 capsule by mouth daily       No current facility-administered medications for this visit. I will continue her current medication regimen  which is part of the above treatment schedule. It has been helping with Ms. Bustos's chronic  medical problems which for this visit include:   Diagnoses of Lumbosacral radiculopathy, Lumbosacral spondylosis without myelopathy, Mononeuropathy of left lower extremity, Chronic pain syndrome, Mononeuropathy of right lower extremity, and Encounter for therapeutic drug monitoring were pertinent to this visit. Risks and benefits of the medications and other alternative treatments  including no treatment were discussed with the patient. The common side effects of these medications were also explained to the patient. Informed verbal consent was obtained. Goals of current treatment regimen include improvement in pain, restoration of functioning- with focus on improvement in physical performance, general activity, work or disability,emotional distress, health care utilization and  decreased medication consumption. Will continue to monitor progress towards achieving/maintaining therapeutic goals with special emphasis on  1. Improvement in perceived interfernce  of pain with ADL's. Ability to do home exercises independently. Ability to do household chores indoor and/or outdoor work and social and leisure activities. Improve psychosocial and physical functioning. NOT PROGRESSING    She was advised against drinking alcohol with the narcotic pain medicines, advised against driving or handling machinery while adjusting the dose of medicines or if having cognitive  issues related to the current medications. Risk of overdose and death, if medicines not taken as prescribed, were also discussed. If the patient develops new symptoms or if the symptoms worsen, the patient should call the office. While transcribing every attempt was made to maintain the accuracy of the note in terms of it's contents,there may have been some errors made inadvertently. Thank you for allowing me to participate in the care of this patient.     Marcos Pickering NP-C    Cc: Monroe Crum MD

## 2022-09-02 ENCOUNTER — OFFICE VISIT (OUTPATIENT)
Dept: PRIMARY CARE CLINIC | Age: 72
End: 2022-09-02
Payer: COMMERCIAL

## 2022-09-02 VITALS
SYSTOLIC BLOOD PRESSURE: 112 MMHG | BODY MASS INDEX: 27.15 KG/M2 | DIASTOLIC BLOOD PRESSURE: 72 MMHG | HEIGHT: 67 IN | RESPIRATION RATE: 16 BRPM | OXYGEN SATURATION: 97 % | TEMPERATURE: 97.6 F | HEART RATE: 63 BPM | WEIGHT: 173 LBS

## 2022-09-02 DIAGNOSIS — R73.03 PREDIABETES: ICD-10-CM

## 2022-09-02 DIAGNOSIS — M17.11 ARTHRITIS OF RIGHT KNEE: Primary | ICD-10-CM

## 2022-09-02 DIAGNOSIS — R20.2 PARESTHESIA OF FOOT, BILATERAL: ICD-10-CM

## 2022-09-02 PROBLEM — G57.92 NEUROPATHY OF FOOT, LEFT: Status: RESOLVED | Noted: 2022-08-12 | Resolved: 2022-09-02

## 2022-09-02 PROCEDURE — 1123F ACP DISCUSS/DSCN MKR DOCD: CPT | Performed by: STUDENT IN AN ORGANIZED HEALTH CARE EDUCATION/TRAINING PROGRAM

## 2022-09-02 PROCEDURE — 1036F TOBACCO NON-USER: CPT | Performed by: STUDENT IN AN ORGANIZED HEALTH CARE EDUCATION/TRAINING PROGRAM

## 2022-09-02 PROCEDURE — 1090F PRES/ABSN URINE INCON ASSESS: CPT | Performed by: STUDENT IN AN ORGANIZED HEALTH CARE EDUCATION/TRAINING PROGRAM

## 2022-09-02 PROCEDURE — G8400 PT W/DXA NO RESULTS DOC: HCPCS | Performed by: STUDENT IN AN ORGANIZED HEALTH CARE EDUCATION/TRAINING PROGRAM

## 2022-09-02 PROCEDURE — G8417 CALC BMI ABV UP PARAM F/U: HCPCS | Performed by: STUDENT IN AN ORGANIZED HEALTH CARE EDUCATION/TRAINING PROGRAM

## 2022-09-02 PROCEDURE — G8427 DOCREV CUR MEDS BY ELIG CLIN: HCPCS | Performed by: STUDENT IN AN ORGANIZED HEALTH CARE EDUCATION/TRAINING PROGRAM

## 2022-09-02 PROCEDURE — 3017F COLORECTAL CA SCREEN DOC REV: CPT | Performed by: STUDENT IN AN ORGANIZED HEALTH CARE EDUCATION/TRAINING PROGRAM

## 2022-09-02 PROCEDURE — 99214 OFFICE O/P EST MOD 30 MIN: CPT | Performed by: STUDENT IN AN ORGANIZED HEALTH CARE EDUCATION/TRAINING PROGRAM

## 2022-09-02 ASSESSMENT — ENCOUNTER SYMPTOMS
SHORTNESS OF BREATH: 0
ABDOMINAL PAIN: 0
WHEEZING: 0

## 2022-09-02 NOTE — ASSESSMENT & PLAN NOTE
Controlled. Continue with lifestyle modification, including carb controlled diet and regular exercise as tolerated.

## 2022-09-02 NOTE — ASSESSMENT & PLAN NOTE
Patient was educated on the fact that it is too soon for other injection, considering most recent one was just 2 months ago. She should continue with conservative management.

## 2022-09-02 NOTE — PROGRESS NOTES
Patient:  Flor Flores 81 y.o. female     Date of Service: 9/2/2022       Chief complaint:   Chief Complaint   Patient presents with    Knee Pain     Pt here for (R) knee injection       History of Present Illness   Patient presents today for follow-up. She is accompanied by his son. Prediabetes: Diagnosed several years ago. She has not been on any medication. She checks blood sugar at home with variable results (fasting ranging between 100 and 150s). HbA1c 2 months ago was stable at 6.2. Of note, patient is taking ASA 81 mg, but this was not prescribed by any provider, it was recommended by family friends that can help prevent stroke or heart attack. Patient has no history of stroke or MI. Burning sensation bilateral feet: Patient was referred to rheumatology with unremarkable work-up. She was subsequently referred to pain specialist, who prescribed her tramadol as needed. She has stopped taking gabapentin because it wasn't helping. Right knee pain: Hx or chronic arthritis. She underwent knee replacement surgery for her left knee. She wants to hold off surgery of her right knee and requesting for intra-articular steroid injection. Of note, patient underwent knee injection just 2 months ago. She reports that her knee pain significantly improved about a month following injection, but has gradually returned. She is still not ready for surgery. [employer] Systems:   Review of Systems   Constitutional:  Negative for fever. Respiratory:  Negative for shortness of breath and wheezing. Cardiovascular:  Negative for chest pain and palpitations. Gastrointestinal:  Negative for abdominal pain. Musculoskeletal:         Knee pain     Physical Exam   Vitals: /72   Pulse 63   Temp 97.6 °F (36.4 °C)   Resp 16   Ht 5' 7\" (1.702 m)   Wt 173 lb (78.5 kg)   SpO2 97%   BMI 27.10 kg/m²   Physical Exam  Constitutional:       Appearance: Normal appearance.    Cardiovascular:      Rate and Rhythm: Normal rate and regular rhythm. Pulses: Normal pulses. Heart sounds: Normal heart sounds. Pulmonary:      Effort: Pulmonary effort is normal.      Breath sounds: Normal breath sounds. Neurological:      Mental Status: She is alert and oriented to person, place, and time. Psychiatric:         Mood and Affect: Mood normal.         Behavior: Behavior normal.          No results found for this visit on 09/02/22. Assessment and Plan   1. Arthritis of right knee  Assessment & Plan:  Patient was educated on the fact that it is too soon for other injection, considering most recent one was just 2 months ago. She should continue with conservative management. 2. Prediabetes  Assessment & Plan:  Controlled. Continue with lifestyle modification, including carb controlled diet and regular exercise as tolerated. 3. Paresthesia of foot, bilateral  Assessment & Plan:   Improving. Follow-up with pain specialist as scheduled. Patient was warned about potential abuse risks with opiates. She should be cautious with with tramadol. Issues to address at future visit/s:      Return to Office: Return in about 5 months (around 2/2/2023), or for follow up. Medication List:    Current Outpatient Medications   Medication Sig Dispense Refill    traMADol (ULTRAM) 50 MG tablet Take 1 tablet by mouth every 6 hours as needed for Pain for up to 30 days. 120 tablet 1    Multiple Vitamins-Minerals (MULTIVITAMIN ADULTS PO) Take by mouth      aspirin 81 MG EC tablet Take 81 mg by mouth in the morning. Handicap Placard MISC by Does not apply route Duration: 5 years 1 each 0    fish oil-omega-3 fatty acids 1000 MG capsule Take 1 capsule by mouth daily       No current facility-administered medications for this visit. Electronically signed by Perfecto Low MD on 9/2/2022 at 9:55 AM     This dictation was generated by voice recognition computer software.   Although all attempts are made to edit the dictation for accuracy, there may be errors in the transcription that are not intended.

## 2022-09-02 NOTE — ASSESSMENT & PLAN NOTE
Improving. Follow-up with pain specialist as scheduled. Patient was warned about potential abuse risks with opiates. She should be cautious with with tramadol.

## 2022-10-06 ENCOUNTER — TELEPHONE (OUTPATIENT)
Dept: ADMINISTRATIVE | Age: 72
End: 2022-10-06

## 2022-10-06 DIAGNOSIS — G57.92 MONONEUROPATHY OF LEFT LOWER EXTREMITY: Primary | ICD-10-CM

## 2022-10-06 RX ORDER — TRAMADOL HYDROCHLORIDE 50 MG/1
50 TABLET ORAL EVERY 6 HOURS PRN
Qty: 112 TABLET | Refills: 0 | Status: SHIPPED | OUTPATIENT
Start: 2022-10-06 | End: 2022-11-03

## 2022-10-06 NOTE — TELEPHONE ENCOUNTER
I received a PA request for traMADol HCl 50MG tablets. The script in the chart is ENDED. traMADol (ULTRAM) 50 MG tablet [[id number]]  traMADol (ULTRAM) 50 MG tablet [[id number]]  ENDED    Order Details  Dose: 50 mg Route: Oral Frequency: EVERY 6 HOURS PRN for Pain   Dispense Quantity: 120 tablet Refills: 1    Note to Pharmacy: Reduce doses taken as pain becomes manageable         Sig: Take 1 tablet by mouth every 6 hours as needed for Pain for up to 30 days. Start Date: 09/01/22 End Date: 10/01/22   Written Date: 09/01/22 Expiration Date: 02/28/23       Diagnosis Association: Lumbosacral radiculopathy (M54.17); Lumbosacral spondylosis without myelopathy (M47.817); Mononeuropathy of left lower extremity (G57.92)     Is the patient still taking this medication? If so please update script as I cannot process PA with an ended script. Thank you.

## 2022-11-07 ENCOUNTER — TELEPHONE (OUTPATIENT)
Dept: PAIN MANAGEMENT | Age: 72
End: 2022-11-07

## 2022-11-07 DIAGNOSIS — G57.92 MONONEUROPATHY OF LEFT LOWER EXTREMITY: Primary | ICD-10-CM

## 2022-11-07 NOTE — TELEPHONE ENCOUNTER
Patient needs a refill on Tramadol       [facility] [id number] Jo Peter, [address] -  [phone]

## 2022-11-07 NOTE — TELEPHONE ENCOUNTER
I spoke to patient's son Isabella Marin, I explained previous notes. He said he was not aware that she was suppose to take it 1-2 times per day. He will  the refill from the pharmacy.

## 2022-11-08 RX ORDER — TRAMADOL HYDROCHLORIDE 50 MG/1
50 TABLET ORAL EVERY 6 HOURS PRN
Qty: 120 TABLET | Refills: 1 | Status: SHIPPED | OUTPATIENT
Start: 2022-11-08 | End: 2022-12-08

## 2022-11-08 NOTE — TELEPHONE ENCOUNTER
Ok then I will send a refill. My understanding is that the son is mailing the medication to her. I did put a refill on the script. Just let him know when she returns she will need FU. Thanks!

## 2022-11-08 NOTE — TELEPHONE ENCOUNTER
Patient called back and stated that they do not have any refills at the pharmacy, they called and asked and there is none there       Please advise

## 2023-02-02 ENCOUNTER — OFFICE VISIT (OUTPATIENT)
Dept: PAIN MANAGEMENT | Age: 73
End: 2023-02-02
Payer: COMMERCIAL

## 2023-02-02 VITALS
HEART RATE: 63 BPM | DIASTOLIC BLOOD PRESSURE: 74 MMHG | OXYGEN SATURATION: 96 % | WEIGHT: 176.8 LBS | SYSTOLIC BLOOD PRESSURE: 128 MMHG | BODY MASS INDEX: 27.69 KG/M2

## 2023-02-02 DIAGNOSIS — M47.817 LUMBOSACRAL SPONDYLOSIS WITHOUT MYELOPATHY: ICD-10-CM

## 2023-02-02 DIAGNOSIS — G57.92 MONONEUROPATHY OF LEFT LOWER EXTREMITY: ICD-10-CM

## 2023-02-02 DIAGNOSIS — G89.4 CHRONIC PAIN SYNDROME: ICD-10-CM

## 2023-02-02 DIAGNOSIS — Z51.81 ENCOUNTER FOR THERAPEUTIC DRUG MONITORING: ICD-10-CM

## 2023-02-02 DIAGNOSIS — G57.91 MONONEUROPATHY OF RIGHT LOWER EXTREMITY: ICD-10-CM

## 2023-02-02 DIAGNOSIS — M54.17 LUMBOSACRAL RADICULOPATHY: ICD-10-CM

## 2023-02-02 PROCEDURE — 99213 OFFICE O/P EST LOW 20 MIN: CPT | Performed by: NURSE PRACTITIONER

## 2023-02-02 PROCEDURE — G8484 FLU IMMUNIZE NO ADMIN: HCPCS | Performed by: NURSE PRACTITIONER

## 2023-02-02 PROCEDURE — G8427 DOCREV CUR MEDS BY ELIG CLIN: HCPCS | Performed by: NURSE PRACTITIONER

## 2023-02-02 PROCEDURE — 3017F COLORECTAL CA SCREEN DOC REV: CPT | Performed by: NURSE PRACTITIONER

## 2023-02-02 PROCEDURE — 1090F PRES/ABSN URINE INCON ASSESS: CPT | Performed by: NURSE PRACTITIONER

## 2023-02-02 PROCEDURE — G8417 CALC BMI ABV UP PARAM F/U: HCPCS | Performed by: NURSE PRACTITIONER

## 2023-02-02 PROCEDURE — 1123F ACP DISCUSS/DSCN MKR DOCD: CPT | Performed by: NURSE PRACTITIONER

## 2023-02-02 PROCEDURE — 1036F TOBACCO NON-USER: CPT | Performed by: NURSE PRACTITIONER

## 2023-02-02 PROCEDURE — G8400 PT W/DXA NO RESULTS DOC: HCPCS | Performed by: NURSE PRACTITIONER

## 2023-02-02 RX ORDER — TRAMADOL HYDROCHLORIDE 50 MG/1
50 TABLET ORAL EVERY 6 HOURS PRN
Qty: 120 TABLET | Refills: 1 | Status: SHIPPED | OUTPATIENT
Start: 2023-02-02 | End: 2023-03-04

## 2023-02-02 RX ORDER — NALOXONE HYDROCHLORIDE 4 MG/.1ML
1 SPRAY NASAL PRN
Qty: 1 EACH | Refills: 0 | Status: SHIPPED | OUTPATIENT
Start: 2023-02-02 | End: 2023-02-03

## 2023-02-02 NOTE — PROGRESS NOTES
Emily Bustos  1950  [id number]      HISTORY OF PRESENT ILLNESS: Ms. Yandy Arora is a 81 y.o. female returns for a follow up visit for pain management  She has a diagnosis of   1. Lumbosacral spondylosis without myelopathy    2. Encounter for therapeutic drug monitoring    3. Lumbosacral radiculopathy    4. Mononeuropathy of left lower extremity    5. Chronic pain syndrome    6. Mononeuropathy of right lower extremity    . As per Information Obtained from the PADT (Patient Assessment and Documentation Tool)    She complains of pain in the right knee, bilateral feet. She rates the pain 7/10 and describes it as burning, numbness, pins and needles. Current treatment regimen has helped relieve about 65% of the pain. She has headache any side effects from the current pain regimen. Patient reports that since the last follow up visit the physical functioning is better, family/social relationships are better, mood is unchanged sleep patterns are better, and that the overall functioning is unchanged. Patient denies misusing/abusing her narcotic pain medications or using any illegal drugs. Upon obtaining medical history from Ms. Bustos    ALLERGIES: Patients list of allergies were reviewed     MEDICATIONS: Ms. Yandy Arora list of medications were reviewed. Her current medications are   Outpatient Medications Prior to Visit   Medication Sig Dispense Refill    Multiple Vitamins-Minerals (MULTIVITAMIN ADULTS PO) Take by mouth      aspirin 81 MG EC tablet Take 81 mg by mouth in the morning. Handicap Placard MISC by Does not apply route Duration: 5 years 1 each 0    fish oil-omega-3 fatty acids 1000 MG capsule Take 1 capsule by mouth daily       No facility-administered medications prior to visit. SOCIAL/FAMILY/PAST MEDICAL HISTORY: Ms. Prather Hayden, family and past medical history was reviewed.      REVIEW OF SYSTEMS:    Respiratory: Negative for apnea, chest tightness and shortness of breath or change in baseline breathing.    Gastrointestinal: Negative for nausea, vomiting, abdominal pain, diarrhea, constipation, blood in stool and abdominal distention.       PHYSICAL EXAM:   Nursing note and vitals reviewed. There were no vitals taken for this visit.  Constitutional: She appears well-developed and well-nourished. No acute distress.   Skin: Skin is warm and dry, good turgor. No rash noted. She is not diaphoretic.  Cardiovascular: Normal rate, regular rhythm, normal heart sounds, and does not have murmur.     Pulmonary/Chest: Effort normal. No respiratory distress. She does not have wheezes in the lung fields. She has no rales.     Neurological/Psychiatric:She is alert and oriented to person, place, and time. Coordination is  normal.  Her mood isAppropriate and affect is Neutral/Euthymic(normal) .     Prescription pain medication monitoring:                  MEDD current = 20              ORT Score = 0              Other Risk factors - stable mood               Date of Last Medication Agreement: 08/12/2022              Date Naloxone prescribed: 02/02/2023              UDT:                          Date of last UDT: 08/12/2022                          Adverse report: No              OARRS:                          Checked today: Yes                          Adverse report: No    IMPRESSION:   1. Lumbosacral spondylosis without myelopathy    2. Encounter for therapeutic drug monitoring    3. Lumbosacral radiculopathy    4. Mononeuropathy of left lower extremity    5. Chronic pain syndrome    6. Mononeuropathy of right lower extremity        PLAN:  Informed verbal consent was obtained:  -refill tramadol, this is helping feet pain. She is taking 3-4 tabs per day. [place] the [place] for now  -f/u 2 months for reassessment     Analgesic Plan:              Continue present regimen:yes              Adjust dose of present analgesic:no              Switch analgesics:no              Add/Adjust concomitant  therapy:no      Current Outpatient Medications   Medication Sig Dispense Refill    Multiple Vitamins-Minerals (MULTIVITAMIN ADULTS PO) Take by mouth      aspirin 81 MG EC tablet Take 81 mg by mouth in the morning. Handicap Placard MISC by Does not apply route Duration: 5 years 1 each 0    fish oil-omega-3 fatty acids 1000 MG capsule Take 1 capsule by mouth daily       No current facility-administered medications for this visit. I will continue her current medication regimen  which is part of the above treatment schedule. It has been helping with Ms. Bustos's chronic  medical problems which for this visit include:   Diagnoses of Lumbosacral spondylosis without myelopathy, Encounter for therapeutic drug monitoring, Lumbosacral radiculopathy, Mononeuropathy of left lower extremity, Chronic pain syndrome, and Mononeuropathy of right lower extremity were pertinent to this visit. Risks and benefits of the medications and other alternative treatments  including no treatment were discussed with the patient. The common side effects of these medications were also explained to the patient. Informed verbal consent was obtained. Goals of current treatment regimen include improvement in pain, restoration of functioning- with focus on improvement in physical performance, general activity, work or disability,emotional distress, health care utilization and  decreased medication consumption. Will continue to monitor progress towards achieving/maintaining therapeutic goals with special emphasis on  1. Improvement in perceived interfernce  of pain with ADL's. Ability to do home exercises independently. Ability to do household chores indoor and/or outdoor work and social and leisure activities. Improve psychosocial and physical functioning. - she is showing progression towards this treatment goal with the current regimen.      She was advised against drinking alcohol with the narcotic pain medicines, advised against driving or handling machinery while adjusting the dose of medicines or if having cognitive  issues related to the current medications. Risk of overdose and death, if medicines not taken as prescribed, were also discussed. If the patient develops new symptoms or if the symptoms worsen, the patient should call the office. While transcribing every attempt was made to maintain the accuracy of the note in terms of it's contents,there may have been some errors made inadvertently. Thank you for allowing me to participate in the care of this patient.     AMAURY Posada    Cc: Rita Giron MD

## 2023-02-03 ENCOUNTER — OFFICE VISIT (OUTPATIENT)
Dept: PRIMARY CARE CLINIC | Age: 73
End: 2023-02-03
Payer: COMMERCIAL

## 2023-02-03 VITALS
WEIGHT: 176 LBS | OXYGEN SATURATION: 97 % | HEIGHT: 67 IN | BODY MASS INDEX: 27.62 KG/M2 | HEART RATE: 61 BPM | RESPIRATION RATE: 16 BRPM | SYSTOLIC BLOOD PRESSURE: 118 MMHG | DIASTOLIC BLOOD PRESSURE: 72 MMHG | TEMPERATURE: 96.7 F

## 2023-02-03 DIAGNOSIS — R73.03 PREDIABETES: Primary | ICD-10-CM

## 2023-02-03 DIAGNOSIS — R20.2 PARESTHESIA OF FOOT, BILATERAL: ICD-10-CM

## 2023-02-03 DIAGNOSIS — M17.11 ARTHRITIS OF RIGHT KNEE: ICD-10-CM

## 2023-02-03 DIAGNOSIS — H91.93 DECREASED HEARING, BILATERAL: ICD-10-CM

## 2023-02-03 DIAGNOSIS — R10.9 ABDOMINAL PRESSURE: ICD-10-CM

## 2023-02-03 LAB — HBA1C MFR BLD: 5.8 %

## 2023-02-03 PROCEDURE — G8417 CALC BMI ABV UP PARAM F/U: HCPCS | Performed by: STUDENT IN AN ORGANIZED HEALTH CARE EDUCATION/TRAINING PROGRAM

## 2023-02-03 PROCEDURE — 1123F ACP DISCUSS/DSCN MKR DOCD: CPT | Performed by: STUDENT IN AN ORGANIZED HEALTH CARE EDUCATION/TRAINING PROGRAM

## 2023-02-03 PROCEDURE — G8427 DOCREV CUR MEDS BY ELIG CLIN: HCPCS | Performed by: STUDENT IN AN ORGANIZED HEALTH CARE EDUCATION/TRAINING PROGRAM

## 2023-02-03 PROCEDURE — 83036 HEMOGLOBIN GLYCOSYLATED A1C: CPT | Performed by: STUDENT IN AN ORGANIZED HEALTH CARE EDUCATION/TRAINING PROGRAM

## 2023-02-03 PROCEDURE — 99214 OFFICE O/P EST MOD 30 MIN: CPT | Performed by: STUDENT IN AN ORGANIZED HEALTH CARE EDUCATION/TRAINING PROGRAM

## 2023-02-03 PROCEDURE — G8400 PT W/DXA NO RESULTS DOC: HCPCS | Performed by: STUDENT IN AN ORGANIZED HEALTH CARE EDUCATION/TRAINING PROGRAM

## 2023-02-03 PROCEDURE — 1090F PRES/ABSN URINE INCON ASSESS: CPT | Performed by: STUDENT IN AN ORGANIZED HEALTH CARE EDUCATION/TRAINING PROGRAM

## 2023-02-03 PROCEDURE — 3017F COLORECTAL CA SCREEN DOC REV: CPT | Performed by: STUDENT IN AN ORGANIZED HEALTH CARE EDUCATION/TRAINING PROGRAM

## 2023-02-03 PROCEDURE — 1036F TOBACCO NON-USER: CPT | Performed by: STUDENT IN AN ORGANIZED HEALTH CARE EDUCATION/TRAINING PROGRAM

## 2023-02-03 PROCEDURE — G8484 FLU IMMUNIZE NO ADMIN: HCPCS | Performed by: STUDENT IN AN ORGANIZED HEALTH CARE EDUCATION/TRAINING PROGRAM

## 2023-02-03 SDOH — ECONOMIC STABILITY: INCOME INSECURITY: HOW HARD IS IT FOR YOU TO PAY FOR THE VERY BASICS LIKE FOOD, HOUSING, MEDICAL CARE, AND HEATING?: SOMEWHAT HARD

## 2023-02-03 SDOH — ECONOMIC STABILITY: FOOD INSECURITY: WITHIN THE PAST 12 MONTHS, YOU WORRIED THAT YOUR FOOD WOULD RUN OUT BEFORE YOU GOT MONEY TO BUY MORE.: SOMETIMES TRUE

## 2023-02-03 SDOH — ECONOMIC STABILITY: FOOD INSECURITY: WITHIN THE PAST 12 MONTHS, THE FOOD YOU BOUGHT JUST DIDN'T LAST AND YOU DIDN'T HAVE MONEY TO GET MORE.: SOMETIMES TRUE

## 2023-02-03 SDOH — ECONOMIC STABILITY: HOUSING INSECURITY
IN THE LAST 12 MONTHS, WAS THERE A TIME WHEN YOU DID NOT HAVE A STEADY PLACE TO SLEEP OR SLEPT IN A SHELTER (INCLUDING NOW)?: NO

## 2023-02-03 ASSESSMENT — PATIENT HEALTH QUESTIONNAIRE - PHQ9
SUM OF ALL RESPONSES TO PHQ9 QUESTIONS 1 & 2: 0
SUM OF ALL RESPONSES TO PHQ QUESTIONS 1-9: 0
1. LITTLE INTEREST OR PLEASURE IN DOING THINGS: 0
SUM OF ALL RESPONSES TO PHQ QUESTIONS 1-9: 0
2. FEELING DOWN, DEPRESSED OR HOPELESS: 0

## 2023-02-03 ASSESSMENT — ENCOUNTER SYMPTOMS
SHORTNESS OF BREATH: 0
WHEEZING: 0
ABDOMINAL PAIN: 0

## 2023-02-03 NOTE — ASSESSMENT & PLAN NOTE
Unclear cause. We will continue to monitor symptoms for now. If blood work at her next physical show worsening cholesterol or elevated LFTs, this could be from fatty liver disease and more evaluation and management will be addressed.

## 2023-02-03 NOTE — PROGRESS NOTES
Patient:  Alden Chaidez 81 y.o. female     Date of Service: 2/3/2023       Chief complaint:   Chief Complaint   Patient presents with    Follow-up     Pt here for follow up visit - pt concerned with hearing issues       History of Present Illness   Patient presents today for follow-up. She is accompanied by his son. Prediabetes: Diagnosed several years ago. She has not been on any medication. She checks blood sugar at home with variable results (fasting ranging between 100 and 150s). A1c today is 5.8. Of note, patient is taking ASA 81 mg, but this was not prescribed by any provider, it was recommended by family friends that can help prevent stroke or heart attack. Burning sensation bilateral feet: Patient was referred to rheumatology with unremarkable work-up. She was subsequently referred to pain specialist, who prescribed her tramadol as needed. Today she reports that her symptoms are somewhat manageable. She is only taking tramadol a few times in the past several weeks. Right knee pain: Hx or chronic arthritis. She underwent knee replacement surgery for her left knee. She wants to hold off surgery of her right knee. Patient underwent knee injection 6 months ago. She subsequently received \"jelly injection\" in her right knee while she was in [place] ([facility]) some weeks ago. Today she reports that the pain is manageable. She is still not ready for surgery. Hearing impairment: hearing worsening according to patient's family. No unilateral problem. Patient not aware of it. She wants evaluation. Right back and abdominal pressure: This has been an intermittent issue, occurring a few times every few months. She denies actual pain but just pressure sensation. Sometimes she feels the pressure radiates from the back to the front, other times in the reverse direction. She does not want any further valuation or treatment at this time but just wanted me to be aware.        Reviewof Systems: Review of Systems   Constitutional:  Negative for fever. HENT:  Negative for tinnitus. Decreased hearing   Respiratory:  Negative for shortness of breath and wheezing. Cardiovascular:  Negative for chest pain and palpitations. Gastrointestinal:  Negative for abdominal pain. Intermittent back and abdominal pressure   Musculoskeletal:         Knee pain     Physical Exam   Vitals: /72   Pulse 61   Temp (!) 96.7 °F (35.9 °C)   Resp 16   Ht 5' 7\" (1.702 m)   Wt 176 lb (79.8 kg)   SpO2 97%   BMI 27.57 kg/m²   Physical Exam  Constitutional:       Appearance: Normal appearance. HENT:      Right Ear: Tympanic membrane, ear canal and external ear normal. There is no impacted cerumen. Left Ear: Ear canal and external ear normal. There is no impacted cerumen. Ears:      Comments: Small fluid behind left TM  Cardiovascular:      Rate and Rhythm: Normal rate and regular rhythm. Pulses: Normal pulses. Heart sounds: Normal heart sounds. Pulmonary:      Effort: Pulmonary effort is normal.      Breath sounds: Normal breath sounds. Neurological:      Mental Status: She is alert and oriented to person, place, and time. Psychiatric:         Mood and Affect: Mood normal.         Behavior: Behavior normal.          Results for POC orders placed in visit on 02/03/23   POCT glycosylated hemoglobin (Hb A1C)   Result Value Ref Range    Hemoglobin A1C 5.8 %         Assessment and Plan   1. Prediabetes  Assessment & Plan:  Controlled. A1c today 5.8. Continue with lifestyle modification and low-carb diet. Orders:  -     POCT glycosylated hemoglobin (Hb A1C)  2. Paresthesia of foot, bilateral  Assessment & Plan:  Stable. Continue to follow-up with pain specialist.  3. Arthritis of right knee  Assessment & Plan:  Stable. Continue to monitor for now. 4. Decreased hearing, bilateral  Assessment & Plan:   Patient referred to ENT for audiometry assessment.   Orders:  -     [facility] Cheryl Godoy MD, Otolaryngology, [facility]  5. Abdominal pressure  Assessment & Plan:   Unclear cause. We will continue to monitor symptoms for now. If blood work at her next physical show worsening cholesterol or elevated LFTs, this could be from fatty liver disease and more evaluation and management will be addressed. Issues to address at future visit/s:      Return to Office: Return in about 2 months (around 4/3/2023) for annual physical .    Medication List:    Current Outpatient Medications   Medication Sig Dispense Refill    traMADol (ULTRAM) 50 MG tablet Take 1 tablet by mouth every 6 hours as needed for Pain for up to 30 days. Intended supply: 3 days. Take lowest dose possible to manage pain Max Daily Amount: 200 mg 120 tablet 1    naloxone (NARCAN) 4 MG/0.1ML LIQD nasal spray 1 spray by Nasal route as needed for Opioid Reversal Per protocol. 1 each 0    Multiple Vitamins-Minerals (MULTIVITAMIN ADULTS PO) Take by mouth      aspirin 81 MG EC tablet Take 81 mg by mouth in the morning. Handicap Placard MISC by Does not apply route Duration: 5 years 1 each 0    fish oil-omega-3 fatty acids 1000 MG capsule Take 1 capsule by mouth daily       No current facility-administered medications for this visit. Electronically signed by Declan Day MD on 2/3/2023 at 10:51 AM     This dictation was generated by voice recognition computer software. Although all attempts are made to edit the dictation for accuracy, there may be errors in the transcription that are not intended.

## 2023-03-06 DIAGNOSIS — H91.90 HEARING LOSS, UNSPECIFIED HEARING LOSS TYPE, UNSPECIFIED LATERALITY: Primary | ICD-10-CM

## 2023-03-09 ENCOUNTER — PROCEDURE VISIT (OUTPATIENT)
Dept: AUDIOLOGY | Age: 73
End: 2023-03-09
Payer: COMMERCIAL

## 2023-03-09 ENCOUNTER — OFFICE VISIT (OUTPATIENT)
Dept: ENT CLINIC | Age: 73
End: 2023-03-09
Payer: COMMERCIAL

## 2023-03-09 VITALS
BODY MASS INDEX: 27.83 KG/M2 | HEIGHT: 66 IN | SYSTOLIC BLOOD PRESSURE: 128 MMHG | WEIGHT: 173.2 LBS | TEMPERATURE: 97.8 F | HEART RATE: 58 BPM | DIASTOLIC BLOOD PRESSURE: 63 MMHG

## 2023-03-09 DIAGNOSIS — H90.3 SENSORINEURAL HEARING LOSS (SNHL) OF BOTH EARS: Primary | ICD-10-CM

## 2023-03-09 DIAGNOSIS — H90.3 SENSORINEURAL HEARING LOSS, BILATERAL: Primary | ICD-10-CM

## 2023-03-09 PROCEDURE — G8400 PT W/DXA NO RESULTS DOC: HCPCS | Performed by: OTOLARYNGOLOGY

## 2023-03-09 PROCEDURE — 92557 COMPREHENSIVE HEARING TEST: CPT | Performed by: AUDIOLOGIST

## 2023-03-09 PROCEDURE — 1090F PRES/ABSN URINE INCON ASSESS: CPT | Performed by: OTOLARYNGOLOGY

## 2023-03-09 PROCEDURE — 92567 TYMPANOMETRY: CPT | Performed by: AUDIOLOGIST

## 2023-03-09 PROCEDURE — G8417 CALC BMI ABV UP PARAM F/U: HCPCS | Performed by: OTOLARYNGOLOGY

## 2023-03-09 PROCEDURE — G8427 DOCREV CUR MEDS BY ELIG CLIN: HCPCS | Performed by: OTOLARYNGOLOGY

## 2023-03-09 PROCEDURE — G8484 FLU IMMUNIZE NO ADMIN: HCPCS | Performed by: OTOLARYNGOLOGY

## 2023-03-09 PROCEDURE — 99203 OFFICE O/P NEW LOW 30 MIN: CPT | Performed by: OTOLARYNGOLOGY

## 2023-03-09 PROCEDURE — 3017F COLORECTAL CA SCREEN DOC REV: CPT | Performed by: OTOLARYNGOLOGY

## 2023-03-09 PROCEDURE — 1036F TOBACCO NON-USER: CPT | Performed by: OTOLARYNGOLOGY

## 2023-03-09 PROCEDURE — 1123F ACP DISCUSS/DSCN MKR DOCD: CPT | Performed by: OTOLARYNGOLOGY

## 2023-03-09 RX ORDER — TRAMADOL HYDROCHLORIDE 50 MG/1
50 TABLET ORAL EVERY 6 HOURS PRN
COMMUNITY

## 2023-03-09 ASSESSMENT — ENCOUNTER SYMPTOMS
VOICE CHANGE: 0
FACIAL SWELLING: 0
EYE PAIN: 0
TROUBLE SWALLOWING: 0
CHOKING: 0
SINUS PAIN: 0
EYE REDNESS: 0
RHINORRHEA: 0
DIARRHEA: 0
SORE THROAT: 0
SHORTNESS OF BREATH: 0
COUGH: 0
SINUS PRESSURE: 0
EYE ITCHING: 0
NAUSEA: 0

## 2023-03-09 NOTE — PROGRESS NOTES
Subjective:      Patient ID: Ale Argueta is a 81 y.o. female. HPI  Hearing Loss HPI  CC: hearing loss    General: Maeve Walton is a(n) 81 y.o. female who presents with a one to two year history of difficulty hearing in noisy environments. Has family history of hearing loss. No infections as child. Denies tinnitus. Denies loud work environments. No chemotherapy.  Bilateral. No otorrhea or vertigo        Patient Active Problem List   Diagnosis    Prediabetes    Chronic bilateral low back pain with bilateral sciatica    Burning sensation    Burning with urination    Skin mole    PASQUALE positive    Paresthesia of foot, bilateral    Lumbosacral radiculopathy    History of recurrent miscarriages    Arthritis of right knee    Chronic pain syndrome    Lumbosacral spondylosis without myelopathy    Mononeuropathy of left lower extremity    Decreased hearing, bilateral    Abdominal pressure     Past Surgical History:   Procedure Laterality Date    KNEE SURGERY Left      Family History   Problem Relation Age of Onset    Diabetes Mother      Social History     Socioeconomic History    Marital status:      Spouse name: Not on file    Number of children: Not on file    Years of education: Not on file    Highest education level: Not on file   Occupational History    Not on file   Tobacco Use    Smoking status: Never    Smokeless tobacco: Never   Vaping Use    Vaping Use: Never used   Substance and Sexual Activity    Alcohol use: Never    Drug use: Never    Sexual activity: Not on file   Other Topics Concern    Not on file   Social History Narrative    Not on file     Social Determinants of Health     Financial Resource Strain: Medium Risk    Difficulty of Paying Living Expenses: Somewhat hard   Food Insecurity: Food Insecurity Present    Worried About Running Out of Food in the Last Year: Sometimes true    Ran Out of Food in the Last Year: Sometimes true   Transportation Needs: Unmet Transportation Needs    Lack of Transportation (Medical): Not on file    Lack of Transportation (Non-Medical): Yes   Physical Activity: Insufficiently Active    Days of Exercise per Week: 3 days    Minutes of Exercise per Session: 30 min   Stress: Not on file   Social Connections: Not on file   Intimate Partner Violence: Not on file   Housing Stability: Unknown    Unable to Pay for Housing in the Last Year: Not on file    Number of [place] in the Last Year: Not on file    Unstable Housing in the Last Year: No       DRUG/FOOD ALLERGIES: Patient has no known allergies. CURRENT MEDICATIONS  Prior to Admission medications    Medication Sig Start Date End Date Taking? Authorizing Provider   Multiple Vitamins-Minerals (MULTIVITAMIN ADULTS PO) Take by mouth    Historical Provider, MD   aspirin 81 MG EC tablet Take 81 mg by mouth in the morning. Historical Provider, MD   Handicap Placard MISC by Does not apply route Duration: 5 years 6/28/22   Mo Salmeron MD   fish oil-omega-3 fatty acids 1000 MG capsule Take 1 capsule by mouth daily    Historical Provider, MD       Lab Studies:  Lab Results   Component Value Date    WBC 7.0 06/22/2022    HGB 13.6 06/22/2022    HCT 40.3 06/22/2022    MCV 90.6 06/22/2022     06/22/2022     Lab Results   Component Value Date    GLUCOSE 107 (H) 03/07/2022    BUN 14 03/07/2022    CREATININE 0.6 06/22/2022    K 4.1 03/07/2022     03/07/2022     03/07/2022    CALCIUM 9.7 03/07/2022     No results found for: MG  No results found for: PHOS  Lab Results   Component Value Date    ALKPHOS 68 03/07/2022    ALT 16 03/07/2022    AST 19 03/07/2022    BILITOT 0.3 03/07/2022    PROT 7.2 03/07/2022      Review of Systems   Constitutional:  Negative for activity change, appetite change, chills, fatigue and fever. HENT:  Positive for hearing loss.  Negative for congestion, ear discharge, ear pain, facial swelling, nosebleeds, postnasal drip, rhinorrhea, sinus pressure, sinus pain, sneezing, sore throat, tinnitus, trouble swallowing and voice change. Eyes:  Negative for pain, redness, itching and visual disturbance. Respiratory:  Negative for cough, choking and shortness of breath. Gastrointestinal:  Negative for diarrhea and nausea. Endocrine: Negative for cold intolerance and heat intolerance. Objective:   Physical Exam  Constitutional:       General: She is not in acute distress. Appearance: She is well-developed. HENT:      Head: Not macrocephalic and not microcephalic. No Joe's sign, abrasion, contusion, right periorbital erythema, left periorbital erythema or laceration. Right Ear: Hearing, tympanic membrane, ear canal and external ear normal. No decreased hearing noted. No laceration, drainage, swelling or tenderness. No middle ear effusion. No foreign body. No mastoid tenderness. No hemotympanum. Tympanic membrane is not injected, perforated, retracted or bulging. Tympanic membrane has normal mobility. Left Ear: Hearing, tympanic membrane, ear canal and external ear normal. No decreased hearing noted. No laceration, drainage, swelling or tenderness. No middle ear effusion. No foreign body. No mastoid tenderness. No hemotympanum. Tympanic membrane is not injected, perforated, retracted or bulging. Tympanic membrane has normal mobility. Ears:      Genao exam findings: Does not lateralize. Right Rinne: AC > BC. Left Rinne: AC > BC. Nose: No nasal deformity, septal deviation, laceration, mucosal edema or rhinorrhea. Right Nostril: No epistaxis or occlusion. Left Nostril: No epistaxis or occlusion. Right Turbinates: Not enlarged, swollen or pale. Left Turbinates: Not enlarged, swollen or pale. Right Sinus: No maxillary sinus tenderness or frontal sinus tenderness. Left Sinus: No maxillary sinus tenderness or frontal sinus tenderness. Mouth/Throat:      Lips: No lesions. Mouth: Mucous membranes are moist.      Tongue: No lesions. Palate: No mass. Pharynx: Uvula midline. No oropharyngeal exudate or posterior oropharyngeal erythema. Tonsils: No tonsillar abscesses. Eyes:      General:         Right eye: No discharge. Left eye: No discharge. Extraocular Movements:      Right eye: Normal extraocular motion and no nystagmus. Left eye: Normal extraocular motion and no nystagmus. Conjunctiva/sclera:      Right eye: Right conjunctiva is not injected. No chemosis or exudate. Left eye: Left conjunctiva is not injected. No chemosis or exudate. Pupils:      Right eye: Pupil is reactive. Left eye: Pupil is reactive. Neck:      Thyroid: No thyroid mass or thyromegaly. Cardiovascular:      Rate and Rhythm: Normal rate and regular rhythm. Pulmonary:      Effort: No tachypnea or respiratory distress. Musculoskeletal:      Cervical back: Normal range of motion and neck supple. Lymphadenopathy:      Head:      Right side of head: No preauricular, posterior auricular or occipital adenopathy. Left side of head: No preauricular, posterior auricular or occipital adenopathy. Cervical:      Right cervical: No superficial, deep or posterior cervical adenopathy. Left cervical: No superficial, deep or posterior cervical adenopathy. Skin:     Findings: No bruising, erythema or lesion. Neurological:      Mental Status: She is alert and oriented to person, place, and time. Psychiatric:         Attention and Perception: Attention normal.         Mood and Affect: Mood normal.         Speech: Speech normal.     Patient here for audiology results. Explained audiogram to patient and discussed findings in light of symptoms. Mild high frequency hearing loss. Answered all patient questions and formulated treatment plan. Please see Plan section for further details. Assessment:       Diagnosis Orders   1.  Sensorineural hearing loss (SNHL) of both ears                Plan:      Ordered audiogram  Interpreted audiogram.   Explained to patient and son (). Repeat audiogram in one year.            Cheri Joy MD

## 2023-03-09 NOTE — Clinical Note
Dr. Rolan Pérez,  Please see note from this patient's audiogram.  Please let me know if there is anything further you need.    David [address] [place] Audiologist

## 2023-03-09 NOTE — PROGRESS NOTES
Sirisha Trinidad   1950, 81 y.o. female   [id number]       Referring Provider: Jose Holden MD, PhD  Referral Type: In an order in [address] [place] [address]    Reason for Visit: Evaluation of suspected change in hearing, tinnitus, or balance. ADULT AUDIOLOGIC EVALUATION      Sirisha Trinidad is a 81 y.o. female seen today, 3/9/2023, for an initial audiologic evaluation. Patient was seen accompanied by a family member that served as an  today. AUDIOLOGIC AND OTHER PERTINENT MEDICAL HISTORY:        Sirisha Trinidad noted decreased hearing bilaterally, family has concern, she feels both ears hear about the same. Sirisha Trinidad denied otalgia, aural fullness, otorrhea, tinnitus, dizziness, imbalance, history of occupational/recreational noise exposure, history of ear surgery, and family history of hearing loss. IMPRESSIONS:       Today's results are consistent with bilateral essentially mild sensorineural hearing loss with normal middle ear function and excellent word recognition for conversational speech in both ears. Hearing loss is significant enough to result in difficulty understanding speech in at least some listening environments. Discussed good communication strategeis; follow medical recommendations from Dr. Barbara Tello. ASSESSMENT AND FINDINGS:       Otoscopy revealed: Clear ear canals bilaterally      RIGHT EAR:  Hearing Sensitivity: Within normal limits sloping to mild sensorineural hearing loss. Speech Recognition Threshold: 30 dBHL  Word Recognition: Excellent (92%), based on NU-6 25-word list at 55 dBHL using recorded speech stimuli. Tympanometry: Normal peak pressure and compliance, Type A tympanogram, consistent with normal middle ear function. LEFT EAR:  Hearing Sensitivity: Within normal limits sloping to moderate sensorineural hearing loss. Speech Recognition Threshold: 30 dBHL  Word Recognition: Excellent (92%), based on NU-6 25-word list at 55 dBHL using recorded speech stimuli. Tympanometry: Normal peak pressure and compliance, Type A tympanogram, consistent with normal middle ear function. Reliability: Good  Transducer: Inserts    See scanned audiogram dated 3/9/2023 for results. PATIENT EDUCATION:       The following items were discussed with the patient:   - Good Communication Strategies  - Hearing Loss and Hearing Aids                                                 RECOMMENDATIONS:                                                                                                                                                                                                                                                                      The following items are recommended based on patient report and results from today's appointment:  - Continue medical follow-up with Coral Box MD, PhD.  - [place] Lo[place] hearing as medically indicated and/or sooner if a change in hearing is noted. - If desired, schedule a Hearing Aid Evaluation (HAE) appointment to discuss hearing aid options. Briefly discussed future considerations for amplification; she does not meet traditional Medicaid hearing aid candidacy at this time. - Utilize \"Good Communication Strategies\" as discussed to assist in speech understanding with communication partners.          [address] [place]  Audiologist       Chart CC'd to: Coral Box MD, PhD      Degree of   Hearing Sensitivity dB Range   Within Normal Limits (WNL) 0 - 20   Mild 20 - 40   Moderate 40 - 55   Moderately-Severe 55 - 70   Severe 70 - 90   Profound 90 +

## 2023-04-25 ENCOUNTER — OFFICE VISIT (OUTPATIENT)
Dept: PRIMARY CARE CLINIC | Age: 73
End: 2023-04-25
Payer: COMMERCIAL

## 2023-04-25 VITALS
BODY MASS INDEX: 27.97 KG/M2 | WEIGHT: 174 LBS | DIASTOLIC BLOOD PRESSURE: 68 MMHG | HEIGHT: 66 IN | HEART RATE: 63 BPM | OXYGEN SATURATION: 98 % | SYSTOLIC BLOOD PRESSURE: 110 MMHG | TEMPERATURE: 97 F | RESPIRATION RATE: 16 BRPM

## 2023-04-25 DIAGNOSIS — Z00.00 ANNUAL PHYSICAL EXAM: ICD-10-CM

## 2023-04-25 DIAGNOSIS — R73.03 PREDIABETES: ICD-10-CM

## 2023-04-25 DIAGNOSIS — R20.2 PARESTHESIA OF FOOT, BILATERAL: ICD-10-CM

## 2023-04-25 DIAGNOSIS — Z00.00 ANNUAL PHYSICAL EXAM: Primary | ICD-10-CM

## 2023-04-25 DIAGNOSIS — M17.11 ARTHRITIS OF RIGHT KNEE: ICD-10-CM

## 2023-04-25 LAB
ALBUMIN SERPL-MCNC: 4.3 G/DL (ref 3.4–5)
ALBUMIN/GLOB SERPL: 1.8 {RATIO} (ref 1.1–2.2)
ALP SERPL-CCNC: 71 U/L (ref 40–129)
ALT SERPL-CCNC: 17 U/L (ref 10–40)
ANION GAP SERPL CALCULATED.3IONS-SCNC: 12 MMOL/L (ref 3–16)
AST SERPL-CCNC: 17 U/L (ref 15–37)
BASOPHILS # BLD: 0 K/UL (ref 0–0.2)
BASOPHILS NFR BLD: 0.7 %
BILIRUB SERPL-MCNC: <0.2 MG/DL (ref 0–1)
BUN SERPL-MCNC: 18 MG/DL (ref 7–20)
CALCIUM SERPL-MCNC: 9.1 MG/DL (ref 8.3–10.6)
CHLORIDE SERPL-SCNC: 105 MMOL/L (ref 99–110)
CHOLEST SERPL-MCNC: 209 MG/DL (ref 0–199)
CO2 SERPL-SCNC: 25 MMOL/L (ref 21–32)
CREAT SERPL-MCNC: 0.6 MG/DL (ref 0.6–1.2)
DEPRECATED RDW RBC AUTO: 14.1 % (ref 12.4–15.4)
EOSINOPHIL # BLD: 0.2 K/UL (ref 0–0.6)
EOSINOPHIL NFR BLD: 3.2 %
GFR SERPLBLD CREATININE-BSD FMLA CKD-EPI: >60 ML/MIN/{1.73_M2}
GLUCOSE SERPL-MCNC: 122 MG/DL (ref 70–99)
HCT VFR BLD AUTO: 42.1 % (ref 36–48)
HDLC SERPL-MCNC: 42 MG/DL (ref 40–60)
HGB BLD-MCNC: 13.9 G/DL (ref 12–16)
LDLC SERPL CALC-MCNC: 135 MG/DL
LYMPHOCYTES # BLD: 1.7 K/UL (ref 1–5.1)
LYMPHOCYTES NFR BLD: 29.7 %
MCH RBC QN AUTO: 30.6 PG (ref 26–34)
MCHC RBC AUTO-ENTMCNC: 33.1 G/DL (ref 31–36)
MCV RBC AUTO: 92.6 FL (ref 80–100)
MONOCYTES # BLD: 0.5 K/UL (ref 0–1.3)
MONOCYTES NFR BLD: 8.4 %
NEUTROPHILS # BLD: 3.2 K/UL (ref 1.7–7.7)
NEUTROPHILS NFR BLD: 58 %
PLATELET # BLD AUTO: 194 K/UL (ref 135–450)
PMV BLD AUTO: 8.8 FL (ref 5–10.5)
POTASSIUM SERPL-SCNC: 4.4 MMOL/L (ref 3.5–5.1)
PROT SERPL-MCNC: 6.7 G/DL (ref 6.4–8.2)
RBC # BLD AUTO: 4.54 M/UL (ref 4–5.2)
SODIUM SERPL-SCNC: 142 MMOL/L (ref 136–145)
TRIGL SERPL-MCNC: 162 MG/DL (ref 0–150)
TSH SERPL DL<=0.005 MIU/L-ACNC: 1.71 UIU/ML (ref 0.27–4.2)
VLDLC SERPL CALC-MCNC: 32 MG/DL
WBC # BLD AUTO: 5.6 K/UL (ref 4–11)

## 2023-04-25 PROCEDURE — 99397 PER PM REEVAL EST PAT 65+ YR: CPT | Performed by: FAMILY MEDICINE

## 2023-04-25 ASSESSMENT — ENCOUNTER SYMPTOMS
ABDOMINAL PAIN: 0
BLOOD IN STOOL: 0
DIARRHEA: 0
SORE THROAT: 0
VOMITING: 0
COUGH: 0
SHORTNESS OF BREATH: 0
NAUSEA: 0

## 2023-04-25 NOTE — PROGRESS NOTES
Chief Complaint   Patient presents with    Annual Exam     Pt is here for a physical     Other     Pt declined breast cancer screening          Montezuma Kaveh is a 81 y.o. female who presents for yearly physical. Pt does take a daily Baby ASA on her own    Pt has a hx of neuropathy of unknown etiology in her feet and is followed by Rheumatology and takes tramadol as needed. Pt also has OA knees    Pt did see ENT regarding her hearing loss and does not require hearing aids    Pt has a hx of borderline diabetes and is on a diabetc diet    Pt denies any hx of HTN, CAD or asthma    Pt reports a normal colonoscopy 2018    Surgical hx includes L knee replacement    Pt did complete her COVID vaccine series    Pt is a nonsmoker and denies any alcohol or illegal drug use    FHx negative for CAD or breast/colon cancer      Review of Systems   Constitutional:  Negative for fatigue and fever. HENT:  Negative for congestion, nosebleeds and sore throat. Eyes:         Negative blurred vision or diplopia   Respiratory:  Negative for cough and shortness of breath. Cardiovascular:  Negative for chest pain, palpitations and leg swelling. Gastrointestinal:  Negative for abdominal pain, blood in stool, diarrhea, nausea and vomiting. Negative melena or indigestion   Endocrine: Negative for polydipsia and polyuria. Genitourinary:  Negative for dysuria and hematuria. Musculoskeletal:  Negative for arthralgias. Skin:  Negative for rash. Neurological:  Negative for dizziness, seizures, syncope, speech difficulty, weakness and headaches. Positive paresthesias B/L feet (chronic)   Psychiatric/Behavioral:  Negative for dysphoric mood. The patient is not nervous/anxious. Vitals:    04/25/23 0807   BP: 110/68   Pulse: 63   Resp: 16   Temp: 97 °F (36.1 °C)   SpO2: 98%         Physical Exam  Vitals reviewed. Constitutional:       General: She is not in acute distress.   HENT:      Mouth/Throat:      Mouth:

## 2023-06-15 PROBLEM — Z51.81 ENCOUNTER FOR THERAPEUTIC DRUG MONITORING: Status: ACTIVE | Noted: 2023-06-15

## 2023-07-31 DIAGNOSIS — G57.91 MONONEUROPATHY OF RIGHT LOWER EXTREMITY: ICD-10-CM

## 2023-07-31 DIAGNOSIS — G89.4 CHRONIC PAIN SYNDROME: ICD-10-CM

## 2023-07-31 DIAGNOSIS — M54.17 LUMBOSACRAL RADICULOPATHY: ICD-10-CM

## 2023-07-31 DIAGNOSIS — Z51.81 ENCOUNTER FOR THERAPEUTIC DRUG MONITORING: ICD-10-CM

## 2023-07-31 DIAGNOSIS — M47.817 LUMBOSACRAL SPONDYLOSIS WITHOUT MYELOPATHY: ICD-10-CM

## 2023-07-31 DIAGNOSIS — G57.92 MONONEUROPATHY OF LEFT LOWER EXTREMITY: ICD-10-CM

## 2023-08-04 DIAGNOSIS — M54.17 LUMBOSACRAL RADICULOPATHY: ICD-10-CM

## 2023-08-04 DIAGNOSIS — G57.91 MONONEUROPATHY OF RIGHT LOWER EXTREMITY: ICD-10-CM

## 2023-08-04 DIAGNOSIS — G89.4 CHRONIC PAIN SYNDROME: ICD-10-CM

## 2023-08-04 DIAGNOSIS — G57.92 MONONEUROPATHY OF LEFT LOWER EXTREMITY: ICD-10-CM

## 2023-08-04 DIAGNOSIS — M47.817 LUMBOSACRAL SPONDYLOSIS WITHOUT MYELOPATHY: ICD-10-CM

## 2023-08-04 DIAGNOSIS — Z51.81 ENCOUNTER FOR THERAPEUTIC DRUG MONITORING: ICD-10-CM

## 2023-08-07 ENCOUNTER — TELEPHONE (OUTPATIENT)
Dept: PAIN MANAGEMENT | Age: 73
End: 2023-08-07

## 2023-08-07 DIAGNOSIS — G57.92 MONONEUROPATHY OF LEFT LOWER EXTREMITY: ICD-10-CM

## 2023-08-07 DIAGNOSIS — M54.17 LUMBOSACRAL RADICULOPATHY: ICD-10-CM

## 2023-08-07 DIAGNOSIS — G57.91 MONONEUROPATHY OF RIGHT LOWER EXTREMITY: ICD-10-CM

## 2023-08-07 DIAGNOSIS — M47.817 LUMBOSACRAL SPONDYLOSIS WITHOUT MYELOPATHY: ICD-10-CM

## 2023-08-07 DIAGNOSIS — G89.4 CHRONIC PAIN SYNDROME: ICD-10-CM

## 2023-08-07 DIAGNOSIS — Z51.81 ENCOUNTER FOR THERAPEUTIC DRUG MONITORING: ICD-10-CM

## 2023-08-07 NOTE — TELEPHONE ENCOUNTER
Pt called and said she is out of tramadol. Pt was prescibed 120 tabs for 2 months when usually she recieves 112 for 1 month.  Pt would like a call back         Please advise

## 2023-08-07 NOTE — TELEPHONE ENCOUNTER
I spoke to pharmacist at [facility], she stated that patient picked up 30 tablets first, then 120 tablets. She said the remaining balance of 90 tablets has been inactivated, and a new script is required.

## 2023-08-08 RX ORDER — TRAMADOL HYDROCHLORIDE 50 MG/1
50 TABLET ORAL EVERY 6 HOURS PRN
Qty: 90 TABLET | Refills: 0 | Status: SHIPPED | OUTPATIENT
Start: 2023-08-08 | End: 2023-08-31

## 2023-08-08 NOTE — TELEPHONE ENCOUNTER
I sent the other 90 tabs. In the future the patient should let us know if she gets a partial script because the pharmacy does not notify us.

## 2023-08-16 RX ORDER — TRAMADOL HYDROCHLORIDE 50 MG/1
TABLET ORAL
Qty: 120 TABLET | OUTPATIENT
Start: 2023-08-16

## 2023-08-17 ENCOUNTER — OFFICE VISIT (OUTPATIENT)
Dept: PAIN MANAGEMENT | Age: 73
End: 2023-08-17
Payer: COMMERCIAL

## 2023-08-17 VITALS
HEART RATE: 63 BPM | SYSTOLIC BLOOD PRESSURE: 135 MMHG | OXYGEN SATURATION: 97 % | DIASTOLIC BLOOD PRESSURE: 73 MMHG | BODY MASS INDEX: 29.21 KG/M2 | WEIGHT: 181 LBS

## 2023-08-17 DIAGNOSIS — G57.92 MONONEUROPATHY OF LEFT LOWER EXTREMITY: ICD-10-CM

## 2023-08-17 DIAGNOSIS — Z51.81 ENCOUNTER FOR THERAPEUTIC DRUG MONITORING: ICD-10-CM

## 2023-08-17 DIAGNOSIS — G57.91 MONONEUROPATHY OF RIGHT LOWER EXTREMITY: ICD-10-CM

## 2023-08-17 DIAGNOSIS — M47.817 LUMBOSACRAL SPONDYLOSIS WITHOUT MYELOPATHY: ICD-10-CM

## 2023-08-17 DIAGNOSIS — G89.4 CHRONIC PAIN SYNDROME: Primary | ICD-10-CM

## 2023-08-17 DIAGNOSIS — M54.17 LUMBOSACRAL RADICULOPATHY: ICD-10-CM

## 2023-08-17 PROCEDURE — G8427 DOCREV CUR MEDS BY ELIG CLIN: HCPCS | Performed by: NURSE PRACTITIONER

## 2023-08-17 PROCEDURE — 99213 OFFICE O/P EST LOW 20 MIN: CPT | Performed by: NURSE PRACTITIONER

## 2023-08-17 PROCEDURE — 1123F ACP DISCUSS/DSCN MKR DOCD: CPT | Performed by: NURSE PRACTITIONER

## 2023-08-17 PROCEDURE — 1036F TOBACCO NON-USER: CPT | Performed by: NURSE PRACTITIONER

## 2023-08-17 PROCEDURE — 3017F COLORECTAL CA SCREEN DOC REV: CPT | Performed by: NURSE PRACTITIONER

## 2023-08-17 PROCEDURE — G8417 CALC BMI ABV UP PARAM F/U: HCPCS | Performed by: NURSE PRACTITIONER

## 2023-08-17 PROCEDURE — 1090F PRES/ABSN URINE INCON ASSESS: CPT | Performed by: NURSE PRACTITIONER

## 2023-08-17 PROCEDURE — G8400 PT W/DXA NO RESULTS DOC: HCPCS | Performed by: NURSE PRACTITIONER

## 2023-08-17 RX ORDER — TRAMADOL HYDROCHLORIDE 50 MG/1
50 TABLET ORAL EVERY 6 HOURS PRN
Qty: 120 TABLET | Refills: 1 | Status: CANCELLED | OUTPATIENT
Start: 2023-08-17 | End: 2023-10-16

## 2023-08-17 RX ORDER — HYDROCODONE BITARTRATE AND ACETAMINOPHEN 5; 325 MG/1; MG/1
1 TABLET ORAL EVERY 6 HOURS PRN
Qty: 112 TABLET | Refills: 0 | Status: SHIPPED | OUTPATIENT
Start: 2023-08-17 | End: 2023-09-14

## 2023-08-17 NOTE — PROGRESS NOTES
Emily Bustos  1950  [id number]      HISTORY OF PRESENT ILLNESS: Ms. Hugo Parekh is a 72 y.o. female returns for a follow up visit for pain management  She has a diagnosis of   1. Chronic pain syndrome    2. Lumbosacral spondylosis without myelopathy    3. Encounter for therapeutic drug monitoring    4. Lumbosacral radiculopathy    5. Mononeuropathy of left lower extremity    6. Mononeuropathy of right lower extremity    . As per Information Obtained from the PADT (Patient Assessment and Documentation Tool)    She complains of pain in the left knee, bilateral feet. She rates the pain 6/10 and describes it as sharp, aching, burning, numbness. Current treatment regimen has helped relieve about 50% of the pain. She denies any side effects from the current pain regimen. Patient reports that since the last follow up visit the physical functioning is unchanged, family/social relationships are unchanged, mood is unchanged sleep patterns are unchanged, and that the overall functioning is unchanged. Patient denies misusing/abusing her narcotic pain medications or using any illegal drugs. Upon obtaining medical history from Ms. Bustos    ALLERGIES: Patients list of allergies were reviewed     MEDICATIONS: Ms. Hugo Parekh list of medications were reviewed. Her current medications are   Outpatient Medications Prior to Visit   Medication Sig Dispense Refill    traMADol (ULTRAM) 50 MG tablet Take 1 tablet by mouth every 6 hours as needed for Pain for up to 23 days. Max Daily Amount: 200 mg 90 tablet 0    Multiple Vitamins-Minerals (MULTIVITAMIN ADULTS PO) Take by mouth      aspirin 81 MG EC tablet Take 1 tablet by mouth daily      Handicap Placard MISC by Does not apply route Duration: 5 years 1 each 0    fish oil-omega-3 fatty acids 1000 MG capsule Take 1 capsule by mouth daily       No facility-administered medications prior to visit.        SOCIAL/FAMILY/PAST MEDICAL HISTORY: Ms. Montaño Micahel, family and past medical

## 2023-09-12 RX ORDER — TRAMADOL HYDROCHLORIDE 50 MG/1
50 TABLET ORAL EVERY 6 HOURS PRN
Qty: 120 TABLET | Refills: 1 | OUTPATIENT
Start: 2023-09-12 | End: 2023-11-11

## 2023-09-15 ENCOUNTER — OFFICE VISIT (OUTPATIENT)
Dept: PAIN MANAGEMENT | Age: 73
End: 2023-09-15

## 2023-09-15 VITALS
HEART RATE: 60 BPM | BODY MASS INDEX: 28.86 KG/M2 | OXYGEN SATURATION: 95 % | SYSTOLIC BLOOD PRESSURE: 125 MMHG | DIASTOLIC BLOOD PRESSURE: 68 MMHG | WEIGHT: 178.8 LBS

## 2023-09-15 DIAGNOSIS — G57.92 MONONEUROPATHY OF LEFT LOWER EXTREMITY: ICD-10-CM

## 2023-09-15 DIAGNOSIS — M47.817 LUMBOSACRAL SPONDYLOSIS WITHOUT MYELOPATHY: ICD-10-CM

## 2023-09-15 DIAGNOSIS — M54.17 LUMBOSACRAL RADICULOPATHY: ICD-10-CM

## 2023-09-15 DIAGNOSIS — Z51.81 ENCOUNTER FOR THERAPEUTIC DRUG MONITORING: ICD-10-CM

## 2023-09-15 DIAGNOSIS — G89.4 CHRONIC PAIN SYNDROME: Primary | ICD-10-CM

## 2023-09-15 DIAGNOSIS — G57.91 MONONEUROPATHY OF RIGHT LOWER EXTREMITY: ICD-10-CM

## 2023-09-15 RX ORDER — HYDROCODONE BITARTRATE AND ACETAMINOPHEN 5; 325 MG/1; MG/1
1 TABLET ORAL EVERY 6 HOURS PRN
Qty: 112 TABLET | Refills: 0 | Status: SHIPPED | OUTPATIENT
Start: 2023-09-15 | End: 2023-10-13

## 2023-09-15 NOTE — PROGRESS NOTES
to tramadol at next ov to allow for a refill as she will be traveling. No new opiate SE.  C/o continued cody feet neuropathic pain  -she does use PRN topicals for her feet pain   -cont and refill Norco 5mg tabs QID Prn pain   -f/u 4 weeks for reassessment       Analgesic Plan:              Continue present regimen: yes              Adjust dose of present analgesic:no              Switch analgesics:no              Add/Adjust concomitant therapy:no      Current Outpatient Medications   Medication Sig Dispense Refill    Multiple Vitamins-Minerals (MULTIVITAMIN ADULTS PO) Take by mouth      aspirin 81 MG EC tablet Take 1 tablet by mouth daily      Handicap Placard MISC by Does not apply route Duration: 5 years 1 each 0    fish oil-omega-3 fatty acids 1000 MG capsule Take 1 capsule by mouth daily       No current facility-administered medications for this visit. I will continue her current medication regimen  which is part of the above treatment schedule. It has been helping with Ms. Bustos's chronic  medical problems which for this visit include: The primary encounter diagnosis was Chronic pain syndrome. Diagnoses of Lumbosacral spondylosis without myelopathy, Encounter for therapeutic drug monitoring, Lumbosacral radiculopathy, Mononeuropathy of left lower extremity, and Mononeuropathy of right lower extremity were also pertinent to this visit. Risks and benefits of the medications and other alternative treatments  including no treatment were discussed with the patient. The common side effects of these medications were also explained to the patient. Informed verbal consent was obtained. Goals of current treatment regimen include improvement in pain, restoration of functioning- with focus on improvement in physical performance, general activity, work or disability,emotional distress, health care utilization and  decreased medication consumption.  Will continue to monitor progress towards achieving/maintaining

## 2023-10-13 ENCOUNTER — OFFICE VISIT (OUTPATIENT)
Dept: PAIN MANAGEMENT | Age: 73
End: 2023-10-13
Payer: COMMERCIAL

## 2023-10-13 VITALS
SYSTOLIC BLOOD PRESSURE: 115 MMHG | DIASTOLIC BLOOD PRESSURE: 63 MMHG | BODY MASS INDEX: 29.12 KG/M2 | OXYGEN SATURATION: 94 % | WEIGHT: 180.4 LBS | HEART RATE: 63 BPM

## 2023-10-13 DIAGNOSIS — G89.4 CHRONIC PAIN SYNDROME: ICD-10-CM

## 2023-10-13 DIAGNOSIS — M54.17 LUMBOSACRAL RADICULOPATHY: ICD-10-CM

## 2023-10-13 DIAGNOSIS — Z51.81 ENCOUNTER FOR THERAPEUTIC DRUG MONITORING: ICD-10-CM

## 2023-10-13 DIAGNOSIS — M47.817 LUMBOSACRAL SPONDYLOSIS WITHOUT MYELOPATHY: ICD-10-CM

## 2023-10-13 DIAGNOSIS — G57.92 MONONEUROPATHY OF LEFT LOWER EXTREMITY: ICD-10-CM

## 2023-10-13 DIAGNOSIS — G57.91 MONONEUROPATHY OF RIGHT LOWER EXTREMITY: ICD-10-CM

## 2023-10-13 PROCEDURE — 1090F PRES/ABSN URINE INCON ASSESS: CPT | Performed by: NURSE PRACTITIONER

## 2023-10-13 PROCEDURE — 3017F COLORECTAL CA SCREEN DOC REV: CPT | Performed by: NURSE PRACTITIONER

## 2023-10-13 PROCEDURE — 1123F ACP DISCUSS/DSCN MKR DOCD: CPT | Performed by: NURSE PRACTITIONER

## 2023-10-13 PROCEDURE — G8400 PT W/DXA NO RESULTS DOC: HCPCS | Performed by: NURSE PRACTITIONER

## 2023-10-13 PROCEDURE — G8427 DOCREV CUR MEDS BY ELIG CLIN: HCPCS | Performed by: NURSE PRACTITIONER

## 2023-10-13 PROCEDURE — G8417 CALC BMI ABV UP PARAM F/U: HCPCS | Performed by: NURSE PRACTITIONER

## 2023-10-13 PROCEDURE — G8484 FLU IMMUNIZE NO ADMIN: HCPCS | Performed by: NURSE PRACTITIONER

## 2023-10-13 PROCEDURE — 99213 OFFICE O/P EST LOW 20 MIN: CPT | Performed by: NURSE PRACTITIONER

## 2023-10-13 PROCEDURE — 1036F TOBACCO NON-USER: CPT | Performed by: NURSE PRACTITIONER

## 2023-10-13 RX ORDER — TRAMADOL HYDROCHLORIDE 50 MG/1
50 TABLET ORAL EVERY 6 HOURS PRN
Qty: 120 TABLET | Refills: 2 | Status: SHIPPED | OUTPATIENT
Start: 2023-10-13 | End: 2024-01-11

## 2023-10-13 NOTE — PROGRESS NOTES
Emily Bustos  1950  [id number]      HISTORY OF PRESENT ILLNESS: Ms. Denisse Santos is a 72 y.o. female returns for a follow up visit for pain management  She has a diagnosis of   1. Lumbosacral spondylosis without myelopathy    2. Encounter for therapeutic drug monitoring    3. Lumbosacral radiculopathy    4. Mononeuropathy of left lower extremity    5. Chronic pain syndrome    6. Mononeuropathy of right lower extremity    . New Medications since Last Office visit have been reviewed with patient. As per Information Obtained from the PADT (Patient Assessment and Documentation Tool)    She complains of pain in the bilateral knees, bilateral feet. She rates the pain 6/10 and describes it as burning, pins and needles. Current treatment regimen has helped relieve about 40% of the pain since beginning treatment plan. She denies any side effects from the current pain regimen. Patient reports that since implementation of their treatment plan; their physical functioning is better, family/social relationships are unchanged, mood is unchanged sleep patterns are unchanged, and that the overall functioning is unchanged. Patient denies/admits that any of the above have changed since last office visit. Patient denies misusing/abusing her narcotic pain medications or using any illegal drugs. Upon obtaining medical history from Ms. Bustos    ALLERGIES: Patients list of allergies were reviewed     MEDICATIONS: Ms. Denisse Sanots list of medications were reviewed. Her current medications are   Outpatient Medications Prior to Visit   Medication Sig Dispense Refill    HYDROcodone-acetaminophen (NORCO) 5-325 MG per tablet Take 1 tablet by mouth every 6 hours as needed for Pain for up to 28 days.  Max Daily Amount: 4 tablets 112 tablet 0    Multiple Vitamins-Minerals (MULTIVITAMIN ADULTS PO) Take by mouth      aspirin 81 MG EC tablet Take 1 tablet by mouth daily      Handicap Placard MISC by Does not apply route Duration: 5 years 1

## 2023-11-15 ENCOUNTER — COMMUNITY OUTREACH (OUTPATIENT)
Dept: PRIMARY CARE CLINIC | Age: 73
End: 2023-11-15

## 2023-11-15 NOTE — PROGRESS NOTES
Patient's  shows they are overdue for Labs and [facility] and  files searched.  updated with Hep C results.

## 2024-02-09 ENCOUNTER — OFFICE VISIT (OUTPATIENT)
Dept: PAIN MANAGEMENT | Age: 74
End: 2024-02-09

## 2024-02-09 VITALS
BODY MASS INDEX: 29.34 KG/M2 | SYSTOLIC BLOOD PRESSURE: 115 MMHG | OXYGEN SATURATION: 97 % | DIASTOLIC BLOOD PRESSURE: 72 MMHG | WEIGHT: 181.8 LBS | HEART RATE: 66 BPM

## 2024-02-09 DIAGNOSIS — G57.92 MONONEUROPATHY OF LEFT LOWER EXTREMITY: ICD-10-CM

## 2024-02-09 DIAGNOSIS — M47.817 LUMBOSACRAL SPONDYLOSIS WITHOUT MYELOPATHY: ICD-10-CM

## 2024-02-09 DIAGNOSIS — G57.91 MONONEUROPATHY OF RIGHT LOWER EXTREMITY: ICD-10-CM

## 2024-02-09 DIAGNOSIS — M54.17 LUMBOSACRAL RADICULOPATHY: ICD-10-CM

## 2024-02-09 DIAGNOSIS — Z51.81 ENCOUNTER FOR THERAPEUTIC DRUG MONITORING: ICD-10-CM

## 2024-02-09 DIAGNOSIS — G89.4 CHRONIC PAIN SYNDROME: ICD-10-CM

## 2024-02-09 RX ORDER — TRAMADOL HYDROCHLORIDE 50 MG/1
50 TABLET ORAL EVERY 6 HOURS PRN
Qty: 120 TABLET | Refills: 2 | Status: SHIPPED | OUTPATIENT
Start: 2024-02-09 | End: 2024-05-09

## 2024-02-09 NOTE — PROGRESS NOTES
and/or outdoor work and social and leisure activities.Improve psychosocial and physical functioning. she is showing progression towards this treatment goal with the current regimen.     She was advised against drinking alcohol with the narcotic pain medicines, advised against driving or handling machinery while adjusting the dose of medicines or if having cognitive  issues related to the current medications.Risk of overdose and death, if medicines not taken as prescribed, were also discussed. If the patient develops new symptoms or if the symptoms worsen, the patient should call the office.    While transcribing every attempt was made to maintain the accuracy of the note in terms of it's contents,there may have been some errors made inadvertently.  Thank you for allowing me to participate in the care of this patient.    Sandra Roy, NP-C    Cc: Ga Herrera MD

## 2024-04-24 SDOH — ECONOMIC STABILITY: FOOD INSECURITY: WITHIN THE PAST 12 MONTHS, THE FOOD YOU BOUGHT JUST DIDN'T LAST AND YOU DIDN'T HAVE MONEY TO GET MORE.: SOMETIMES TRUE

## 2024-04-24 SDOH — ECONOMIC STABILITY: FOOD INSECURITY: WITHIN THE PAST 12 MONTHS, YOU WORRIED THAT YOUR FOOD WOULD RUN OUT BEFORE YOU GOT MONEY TO BUY MORE.: SOMETIMES TRUE

## 2024-04-24 SDOH — ECONOMIC STABILITY: TRANSPORTATION INSECURITY
IN THE PAST 12 MONTHS, HAS LACK OF TRANSPORTATION KEPT YOU FROM MEETINGS, WORK, OR FROM GETTING THINGS NEEDED FOR DAILY LIVING?: NO

## 2024-04-24 SDOH — ECONOMIC STABILITY: INCOME INSECURITY: HOW HARD IS IT FOR YOU TO PAY FOR THE VERY BASICS LIKE FOOD, HOUSING, MEDICAL CARE, AND HEATING?: NOT VERY HARD

## 2024-04-24 ASSESSMENT — PATIENT HEALTH QUESTIONNAIRE - PHQ9
2. FEELING DOWN, DEPRESSED OR HOPELESS: NOT AT ALL
SUM OF ALL RESPONSES TO PHQ QUESTIONS 1-9: 0
2. FEELING DOWN, DEPRESSED OR HOPELESS: NOT AT ALL
SUM OF ALL RESPONSES TO PHQ QUESTIONS 1-9: 0
SUM OF ALL RESPONSES TO PHQ QUESTIONS 1-9: 0
SUM OF ALL RESPONSES TO PHQ9 QUESTIONS 1 & 2: 0
1. LITTLE INTEREST OR PLEASURE IN DOING THINGS: NOT AT ALL
1. LITTLE INTEREST OR PLEASURE IN DOING THINGS: NOT AT ALL
SUM OF ALL RESPONSES TO PHQ9 QUESTIONS 1 & 2: 0
SUM OF ALL RESPONSES TO PHQ QUESTIONS 1-9: 0

## 2024-04-25 ENCOUNTER — HOSPITAL ENCOUNTER (OUTPATIENT)
Dept: GENERAL RADIOLOGY | Age: 74
Discharge: HOME | End: 2024-04-25
Payer: COMMERCIAL

## 2024-04-25 ENCOUNTER — OFFICE VISIT (OUTPATIENT)
Dept: PRIMARY CARE CLINIC | Age: 74
End: 2024-04-25

## 2024-04-25 VITALS
DIASTOLIC BLOOD PRESSURE: 64 MMHG | RESPIRATION RATE: 15 BRPM | SYSTOLIC BLOOD PRESSURE: 98 MMHG | WEIGHT: 179 LBS | HEIGHT: 66 IN | TEMPERATURE: 97.9 F | HEART RATE: 85 BPM | BODY MASS INDEX: 28.77 KG/M2 | OXYGEN SATURATION: 98 %

## 2024-04-25 DIAGNOSIS — Z12.31 ENCOUNTER FOR SCREENING MAMMOGRAM FOR MALIGNANT NEOPLASM OF BREAST: ICD-10-CM

## 2024-04-25 DIAGNOSIS — R73.03 PREDIABETES: ICD-10-CM

## 2024-04-25 DIAGNOSIS — R06.09 DOE (DYSPNEA ON EXERTION): ICD-10-CM

## 2024-04-25 DIAGNOSIS — Z00.00 ANNUAL PHYSICAL EXAM: ICD-10-CM

## 2024-04-25 DIAGNOSIS — Z00.00 ANNUAL PHYSICAL EXAM: Primary | ICD-10-CM

## 2024-04-25 DIAGNOSIS — R20.2 PARESTHESIA OF FOOT, BILATERAL: ICD-10-CM

## 2024-04-25 LAB
BASOPHILS # BLD: 0 K/UL (ref 0–0.2)
BASOPHILS NFR BLD: 0.7 %
DEPRECATED RDW RBC AUTO: 13.3 % (ref 12.4–15.4)
EOSINOPHIL # BLD: 0.2 K/UL (ref 0–0.6)
EOSINOPHIL NFR BLD: 3.1 %
HBA1C MFR BLD: 5.8 %
HCT VFR BLD AUTO: 42.5 % (ref 36–48)
HGB BLD-MCNC: 13.9 G/DL (ref 12–16)
LYMPHOCYTES # BLD: 1.9 K/UL (ref 1–5.1)
LYMPHOCYTES NFR BLD: 28.7 %
MCH RBC QN AUTO: 30.3 PG (ref 26–34)
MCHC RBC AUTO-ENTMCNC: 32.7 G/DL (ref 31–36)
MCV RBC AUTO: 92.7 FL (ref 80–100)
MONOCYTES # BLD: 0.5 K/UL (ref 0–1.3)
MONOCYTES NFR BLD: 7.5 %
NEUTROPHILS # BLD: 4 K/UL (ref 1.7–7.7)
NEUTROPHILS NFR BLD: 60 %
PLATELET # BLD AUTO: 224 K/UL (ref 135–450)
PMV BLD AUTO: 8.9 FL (ref 5–10.5)
RBC # BLD AUTO: 4.58 M/UL (ref 4–5.2)
WBC # BLD AUTO: 6.7 K/UL (ref 4–11)

## 2024-04-25 PROCEDURE — 71046 X-RAY EXAM CHEST 2 VIEWS: CPT

## 2024-04-25 NOTE — PATIENT INSTRUCTIONS
Go to the ER ASAP if you develop any chest pain, shortness of breath at rest, facial droop, slurred speech, weakness/numbness in your arms or legs or double vision!

## 2024-04-25 NOTE — PROGRESS NOTES
Chief Complaint   Patient presents with    Annual Exam    Shortness of Breath         Emily Bustos is a 73 y.o. female who presents for yearly physical. Pt does take a daily Baby ASA on her own     Pt has a hx of neuropathy of unknown etiology in her feet and is followed by Rheumatology and takes tramadol as needed. Pt also has OA knees     Pt did see ENT regarding her hearing loss and does not require hearing aids     Pt has a hx of borderline diabetes and is on a diabetc diet    Patient did bloodwork [lipid panel, CBC, CMP, TSH] last year which was within normal limits except for signs of borderline diabetes with a glucose level 122 along with a mildly elevated total cholesterol 209 with LDL \"bad\" cholesterol 135 and mildly elevated triglyceride \"fat\" level 162 and patient thus needs to follow a low fat low cholesterol diet along with a diabetic diet; avoiding concentrated sweets and consuming a low carbohydrate diet  --- Pt has been following a low cholesterol low fat diabetic diet    Pt denies any hx of HTN, CAD or asthma     Pt reports a normal colonoscopy 2018     Surgical hx includes L knee replacement     Pt did complete her COVID vaccine series     Pt is a nonsmoker and denies any alcohol or illegal drug use     FHx negative for CAD or breast/colon cancer      Review of Systems   Constitutional:  Negative for fatigue and fever.   HENT:  Positive for rhinorrhea. Negative for congestion, nosebleeds and sore throat.    Eyes:         Negative blurred vision or diplopia   Respiratory:  Positive for cough (mucousy at times). Negative for shortness of breath.         Positive SEXTON at times x 8 months   Cardiovascular:  Negative for chest pain, palpitations and leg swelling.   Gastrointestinal:  Negative for abdominal pain, blood in stool, diarrhea, nausea and vomiting.        Negative melena or indigestion   Endocrine: Negative for polydipsia and polyuria.   Genitourinary:  Negative for dysuria and hematuria.

## 2024-04-26 LAB
ALBUMIN SERPL-MCNC: 4.4 G/DL (ref 3.4–5)
ALBUMIN/GLOB SERPL: 1.7 {RATIO} (ref 1.1–2.2)
ALP SERPL-CCNC: 78 U/L (ref 40–129)
ALT SERPL-CCNC: 14 U/L (ref 10–40)
ANION GAP SERPL CALCULATED.3IONS-SCNC: 10 MMOL/L (ref 3–16)
AST SERPL-CCNC: 15 U/L (ref 15–37)
BILIRUB SERPL-MCNC: 0.3 MG/DL (ref 0–1)
BUN SERPL-MCNC: 16 MG/DL (ref 7–20)
CALCIUM SERPL-MCNC: 9.1 MG/DL (ref 8.3–10.6)
CHLORIDE SERPL-SCNC: 101 MMOL/L (ref 99–110)
CHOLEST SERPL-MCNC: 199 MG/DL (ref 0–199)
CO2 SERPL-SCNC: 27 MMOL/L (ref 21–32)
CREAT SERPL-MCNC: 0.5 MG/DL (ref 0.6–1.2)
GFR SERPLBLD CREATININE-BSD FMLA CKD-EPI: >90 ML/MIN/{1.73_M2}
GLUCOSE SERPL-MCNC: 114 MG/DL (ref 70–99)
HDLC SERPL-MCNC: 43 MG/DL (ref 40–60)
LDLC SERPL CALC-MCNC: 121 MG/DL
POTASSIUM SERPL-SCNC: 4.2 MMOL/L (ref 3.5–5.1)
PROT SERPL-MCNC: 7 G/DL (ref 6.4–8.2)
SODIUM SERPL-SCNC: 138 MMOL/L (ref 136–145)
TRIGL SERPL-MCNC: 173 MG/DL (ref 0–150)
TSH SERPL DL<=0.005 MIU/L-ACNC: 2.24 UIU/ML (ref 0.27–4.2)
VLDLC SERPL CALC-MCNC: 35 MG/DL

## 2024-05-14 ENCOUNTER — HOSPITAL ENCOUNTER (OUTPATIENT)
Age: 74
Discharge: HOME | End: 2024-05-16
Payer: COMMERCIAL

## 2024-05-14 VITALS — WEIGHT: 179 LBS | BODY MASS INDEX: 29.82 KG/M2 | HEIGHT: 65 IN

## 2024-05-14 VITALS
HEIGHT: 65 IN | DIASTOLIC BLOOD PRESSURE: 78 MMHG | BODY MASS INDEX: 29.82 KG/M2 | WEIGHT: 179 LBS | SYSTOLIC BLOOD PRESSURE: 160 MMHG | HEART RATE: 62 BPM

## 2024-05-14 DIAGNOSIS — R06.09 DOE (DYSPNEA ON EXERTION): ICD-10-CM

## 2024-05-14 LAB
ECHO BSA: 1.93 M2
NUC STRESS EJECTION FRACTION: 70 %
NUC STRESS LV EDV: 80 ML (ref 56–104)
NUC STRESS LV ESV: 24 ML (ref 19–49)
NUC STRESS LV MASS: 121 G
STRESS BASELINE DIAS BP: 79 MMHG
STRESS BASELINE HR: 64 BPM
STRESS BASELINE SYS BP: 142 MMHG
STRESS ESTIMATED WORKLOAD: 1.3 METS
STRESS O2 SAT REST: 98 %
STRESS PEAK DIAS BP: 81 MMHG
STRESS PEAK SYS BP: 172 MMHG
STRESS PERCENT HR ACHIEVED: 75 %
STRESS POST PEAK HR: 112 BPM
STRESS RATE PRESSURE PRODUCT: NORMAL BPM*MMHG
STRESS TARGET HR: 149 BPM
TID: 0.94

## 2024-05-14 PROCEDURE — 6360000002 HC RX W HCPCS: Performed by: INTERNAL MEDICINE

## 2024-05-14 PROCEDURE — 93017 CV STRESS TEST TRACING ONLY: CPT

## 2024-05-14 PROCEDURE — 93016 CV STRESS TEST SUPVJ ONLY: CPT | Performed by: INTERNAL MEDICINE

## 2024-05-14 PROCEDURE — 78452 HT MUSCLE IMAGE SPECT MULT: CPT

## 2024-05-14 PROCEDURE — 78452 HT MUSCLE IMAGE SPECT MULT: CPT | Performed by: INTERNAL MEDICINE

## 2024-05-14 PROCEDURE — 93018 CV STRESS TEST I&R ONLY: CPT | Performed by: INTERNAL MEDICINE

## 2024-05-14 RX ORDER — REGADENOSON 0.08 MG/ML
0.4 INJECTION, SOLUTION INTRAVENOUS
Status: COMPLETED | OUTPATIENT
Start: 2024-05-14 | End: 2024-05-14

## 2024-05-14 RX ADMIN — REGADENOSON 0.4 MG: 0.08 INJECTION, SOLUTION INTRAVENOUS at 09:50

## 2024-05-16 ENCOUNTER — OFFICE VISIT (OUTPATIENT)
Dept: PAIN MANAGEMENT | Age: 74
End: 2024-05-16
Payer: COMMERCIAL

## 2024-05-16 VITALS
SYSTOLIC BLOOD PRESSURE: 121 MMHG | HEART RATE: 54 BPM | BODY MASS INDEX: 29.79 KG/M2 | DIASTOLIC BLOOD PRESSURE: 61 MMHG | WEIGHT: 179 LBS | OXYGEN SATURATION: 95 %

## 2024-05-16 DIAGNOSIS — M47.817 LUMBOSACRAL SPONDYLOSIS WITHOUT MYELOPATHY: ICD-10-CM

## 2024-05-16 DIAGNOSIS — M54.17 LUMBOSACRAL RADICULOPATHY: ICD-10-CM

## 2024-05-16 DIAGNOSIS — Z51.81 ENCOUNTER FOR THERAPEUTIC DRUG MONITORING: ICD-10-CM

## 2024-05-16 DIAGNOSIS — G57.91 MONONEUROPATHY OF RIGHT LOWER EXTREMITY: ICD-10-CM

## 2024-05-16 DIAGNOSIS — G89.4 CHRONIC PAIN SYNDROME: ICD-10-CM

## 2024-05-16 DIAGNOSIS — G57.92 MONONEUROPATHY OF LEFT LOWER EXTREMITY: ICD-10-CM

## 2024-05-16 PROCEDURE — G8427 DOCREV CUR MEDS BY ELIG CLIN: HCPCS | Performed by: NURSE PRACTITIONER

## 2024-05-16 PROCEDURE — 99213 OFFICE O/P EST LOW 20 MIN: CPT | Performed by: NURSE PRACTITIONER

## 2024-05-16 PROCEDURE — 1090F PRES/ABSN URINE INCON ASSESS: CPT | Performed by: NURSE PRACTITIONER

## 2024-05-16 PROCEDURE — G8417 CALC BMI ABV UP PARAM F/U: HCPCS | Performed by: NURSE PRACTITIONER

## 2024-05-16 PROCEDURE — 1036F TOBACCO NON-USER: CPT | Performed by: NURSE PRACTITIONER

## 2024-05-16 PROCEDURE — 3017F COLORECTAL CA SCREEN DOC REV: CPT | Performed by: NURSE PRACTITIONER

## 2024-05-16 PROCEDURE — G8400 PT W/DXA NO RESULTS DOC: HCPCS | Performed by: NURSE PRACTITIONER

## 2024-05-16 PROCEDURE — 1123F ACP DISCUSS/DSCN MKR DOCD: CPT | Performed by: NURSE PRACTITIONER

## 2024-05-16 RX ORDER — TRAMADOL HYDROCHLORIDE 50 MG/1
50 TABLET ORAL EVERY 6 HOURS PRN
Qty: 120 TABLET | Refills: 2 | Status: SHIPPED | OUTPATIENT
Start: 2024-05-16 | End: 2024-08-14

## 2024-05-16 NOTE — PROGRESS NOTES
Emily Bustos  1950  [id number]      HISTORY OF PRESENT ILLNESS: Ms. Bustos is a 73 y.o. female returns for a follow up visit for pain management  She has a diagnosis of   1. Lumbosacral spondylosis without myelopathy    2. Encounter for therapeutic drug monitoring    3. Lumbosacral radiculopathy    4. Mononeuropathy of left lower extremity    5. Chronic pain syndrome    6. Mononeuropathy of right lower extremity    .      New Medications since Last Office visit have been reviewed with patient.     As per Information Obtained from the PADT (Patient Assessment and Documentation Tool)    She complains of pain in the right knee, bilateral feet. She rates the pain 6/10 and describes it as aching, burning. Current treatment regimen has helped relieve about 50% of the pain since beginning treatment plan.  She denies any side effects from the current pain regimen. Patient reports that since implementation of their treatment plan; their physical functioning is unchanged, family/social relationships are unchanged, mood is unchanged sleep patterns are unchanged, and that the overall functioning is unchanged.  Patient denies/admits that any of the above have changed since last office visit. Patient denies misusing/abusing her narcotic pain medications or using any illegal drugs.      Upon obtaining medical history from Ms. Bustos    ALLERGIES: Patients list of allergies were reviewed     MEDICATIONS: Ms. Bustos list of medications were reviewed.Her current medications are   Outpatient Medications Prior to Visit   Medication Sig Dispense Refill    Multiple Vitamins-Minerals (MULTIVITAMIN ADULTS PO) Take by mouth      aspirin 81 MG EC tablet Take 1 tablet by mouth daily      Handicap Placard MISC by Does not apply route Duration: 5 years 1 each 0    fish oil-omega-3 fatty acids 1000 MG capsule Take 1 capsule by mouth daily       No facility-administered medications prior to visit.       SOCIAL/FAMILY/PAST MEDICAL HISTORY:

## 2024-06-26 ENCOUNTER — OFFICE VISIT (OUTPATIENT)
Dept: PRIMARY CARE CLINIC | Age: 74
End: 2024-06-26
Payer: COMMERCIAL

## 2024-06-26 ENCOUNTER — HOSPITAL ENCOUNTER (OUTPATIENT)
Dept: GENERAL RADIOLOGY | Age: 74
Discharge: HOME | End: 2024-06-26
Payer: COMMERCIAL

## 2024-06-26 VITALS
OXYGEN SATURATION: 98 % | RESPIRATION RATE: 18 BRPM | TEMPERATURE: 97.1 F | SYSTOLIC BLOOD PRESSURE: 120 MMHG | WEIGHT: 179 LBS | HEART RATE: 59 BPM | BODY MASS INDEX: 29.82 KG/M2 | DIASTOLIC BLOOD PRESSURE: 78 MMHG | HEIGHT: 65 IN

## 2024-06-26 DIAGNOSIS — R20.2 PARESTHESIA OF FOOT, BILATERAL: Primary | ICD-10-CM

## 2024-06-26 DIAGNOSIS — M79.602 PAIN OF LEFT UPPER EXTREMITY: ICD-10-CM

## 2024-06-26 DIAGNOSIS — R20.2 PARESTHESIA OF FOOT, BILATERAL: ICD-10-CM

## 2024-06-26 DIAGNOSIS — R06.09 DOE (DYSPNEA ON EXERTION): ICD-10-CM

## 2024-06-26 LAB
25(OH)D3 SERPL-MCNC: 17.3 NG/ML
FOLATE SERPL-MCNC: 19.32 NG/ML (ref 4.78–24.2)
URATE SERPL-MCNC: 6.8 MG/DL (ref 2.6–6)
VIT B12 SERPL-MCNC: >2000 PG/ML (ref 211–911)

## 2024-06-26 PROCEDURE — 73030 X-RAY EXAM OF SHOULDER: CPT

## 2024-06-26 PROCEDURE — 1090F PRES/ABSN URINE INCON ASSESS: CPT | Performed by: FAMILY MEDICINE

## 2024-06-26 PROCEDURE — 1123F ACP DISCUSS/DSCN MKR DOCD: CPT | Performed by: FAMILY MEDICINE

## 2024-06-26 PROCEDURE — 3017F COLORECTAL CA SCREEN DOC REV: CPT | Performed by: FAMILY MEDICINE

## 2024-06-26 PROCEDURE — G8427 DOCREV CUR MEDS BY ELIG CLIN: HCPCS | Performed by: FAMILY MEDICINE

## 2024-06-26 PROCEDURE — 1036F TOBACCO NON-USER: CPT | Performed by: FAMILY MEDICINE

## 2024-06-26 PROCEDURE — G8417 CALC BMI ABV UP PARAM F/U: HCPCS | Performed by: FAMILY MEDICINE

## 2024-06-26 PROCEDURE — G8400 PT W/DXA NO RESULTS DOC: HCPCS | Performed by: FAMILY MEDICINE

## 2024-06-26 PROCEDURE — 73060 X-RAY EXAM OF HUMERUS: CPT

## 2024-06-26 PROCEDURE — 99214 OFFICE O/P EST MOD 30 MIN: CPT | Performed by: FAMILY MEDICINE

## 2024-06-26 NOTE — PROGRESS NOTES
Chief Complaint   Patient presents with    3 Month Follow-Up    Other     Blood work to check for gout and vitamin b 12, vitamin D         Emily Bustos is a 74 y.o. female who presents for followup visit regarding SEXTON.      Patient did recent cardiac stress test and CXR which were normal     Patient did recent bloodwork [lipid panel, CMP, TSH, CBC, UA] which was within normal limits except for a mildly elevated  triglyceride \"fat\" level 173 and glucose level 114 and patient thus needs to follow a low fat diabetic diet; avoiding concentrated sweets and consuming a low carbohydrate diet      Pt does take a daily Baby ASA on her own     Pt has a hx of neuropathy of unknown etiology in her feet and is followed by Rheumatology and takes tramadol as needed. Pt also has OA knees     Pt did see ENT regarding her hearing loss and does not require hearing aids     Pt has a hx of borderline diabetes and is on a diabetc diet     Patient did bloodwork [lipid panel, CBC, CMP, TSH] last year which was within normal limits except for signs of borderline diabetes with a glucose level 122 along with a mildly elevated total cholesterol 209 with LDL \"bad\" cholesterol 135 and mildly elevated triglyceride \"fat\" level 162 and patient thus needs to follow a low fat low cholesterol diet along with a diabetic diet; avoiding concentrated sweets and consuming a low carbohydrate diet  --- Pt has been following a low cholesterol low fat diabetic diet     Pt denies any hx of HTN, CAD or asthma     Pt reports a normal colonoscopy 2018     Surgical hx includes L knee replacement     Pt did complete her COVID vaccine series     Pt is a nonsmoker and denies any alcohol or illegal drug use     FHx negative for CAD or breast/colon cancer       Review of Systems   Constitutional:  Negative for fatigue and fever.   HENT:  Negative for congestion, nosebleeds and sore throat.    Eyes:         Negative blurred vision or diplopia   Respiratory:  Negative

## 2024-06-27 DIAGNOSIS — E55.9 VITAMIN D DEFICIENCY: Primary | ICD-10-CM

## 2024-06-27 RX ORDER — ERGOCALCIFEROL 1.25 MG/1
50000 CAPSULE ORAL WEEKLY
Qty: 12 CAPSULE | Refills: 1 | Status: SHIPPED | OUTPATIENT
Start: 2024-06-27

## 2024-07-11 ENCOUNTER — COMMUNITY OUTREACH (OUTPATIENT)
Dept: PRIMARY CARE CLINIC | Age: 74
End: 2024-07-11

## 2024-08-15 ENCOUNTER — OFFICE VISIT (OUTPATIENT)
Dept: PAIN MANAGEMENT | Age: 74
End: 2024-08-15

## 2024-08-15 VITALS
BODY MASS INDEX: 30.12 KG/M2 | WEIGHT: 181 LBS | HEART RATE: 60 BPM | SYSTOLIC BLOOD PRESSURE: 100 MMHG | OXYGEN SATURATION: 97 % | DIASTOLIC BLOOD PRESSURE: 65 MMHG

## 2024-08-15 DIAGNOSIS — G57.92 MONONEUROPATHY OF LEFT LOWER EXTREMITY: Primary | ICD-10-CM

## 2024-08-15 DIAGNOSIS — M47.817 LUMBOSACRAL SPONDYLOSIS WITHOUT MYELOPATHY: ICD-10-CM

## 2024-08-15 DIAGNOSIS — M54.17 LUMBOSACRAL RADICULOPATHY: ICD-10-CM

## 2024-08-15 DIAGNOSIS — G57.91 MONONEUROPATHY OF RIGHT LOWER EXTREMITY: ICD-10-CM

## 2024-08-15 DIAGNOSIS — G89.4 CHRONIC PAIN SYNDROME: ICD-10-CM

## 2024-08-15 DIAGNOSIS — Z51.81 ENCOUNTER FOR THERAPEUTIC DRUG MONITORING: ICD-10-CM

## 2024-08-15 RX ORDER — HYDROCODONE BITARTRATE AND ACETAMINOPHEN 5; 325 MG/1; MG/1
1 TABLET ORAL EVERY 6 HOURS PRN
Qty: 112 TABLET | Refills: 0 | Status: SHIPPED | OUTPATIENT
Start: 2024-08-15 | End: 2024-09-12

## 2024-08-15 RX ORDER — TRAMADOL HYDROCHLORIDE 50 MG/1
50 TABLET ORAL EVERY 6 HOURS PRN
Qty: 120 TABLET | Refills: 2 | Status: CANCELLED | OUTPATIENT
Start: 2024-08-15 | End: 2024-11-13

## 2024-08-15 NOTE — PROGRESS NOTES
No facility-administered medications prior to visit.       SOCIAL/FAMILY/PAST MEDICAL HISTORY: Ms. Bustos social, family and past medical history was reviewed.     REVIEW OF SYSTEMS:    Respiratory: Negative for apnea, chest tightness and shortness of breath or change in baseline breathing.    Gastrointestinal: Negative for nausea, vomiting, abdominal pain, diarrhea, constipation, blood in stool and abdominal distention.       PHYSICAL EXAM:   Nursing note and vitals reviewed. /65   Pulse 60   Wt 82.1 kg (181 lb)   SpO2 97%   BMI 30.12 kg/m²   Constitutional: She appears well-developed and well-nourished. No acute distress.   Skin: Skin is warm and dry, good turgor. No rash noted. She is not diaphoretic.  Cardiovascular: Normal rate, regular rhythm, normal heart sounds, and does not have murmur.     Pulmonary/Chest: Effort normal. No respiratory distress. She does not have wheezes in the lung fields. She has no rales.     Neurological/Psychiatric:She is alert and oriented to person, place, and time. Coordination is  normal.  Her mood isAppropriate and affect is Neutral/Euthymic(normal) .     Prescription pain medication monitoring:                  MEDD current = 20              ORT Score = 0              Other Risk factors - (mood) stable-well              Date of Last Medication Agreement: 02/09/2024              Date Naloxone prescribed: 02/09/2024              UDT:                          Date of last UDT: 05/16/2024                          Adverse report: no              OARRS:                          Checked today: Yes                          Adverse report: No    IMPRESSION:   1. Lumbosacral spondylosis without myelopathy    2. Encounter for therapeutic drug monitoring    3. Lumbosacral radiculopathy    4. Mononeuropathy of left lower extremity    5. Chronic pain syndrome    6. Mononeuropathy of right lower extremity        PLAN:  Informed verbal consent was obtained:  -may UDS reviewed

## 2024-09-12 ENCOUNTER — OFFICE VISIT (OUTPATIENT)
Dept: PAIN MANAGEMENT | Age: 74
End: 2024-09-12
Payer: COMMERCIAL

## 2024-09-12 VITALS
DIASTOLIC BLOOD PRESSURE: 68 MMHG | OXYGEN SATURATION: 98 % | HEART RATE: 57 BPM | WEIGHT: 181 LBS | BODY MASS INDEX: 30.12 KG/M2 | SYSTOLIC BLOOD PRESSURE: 110 MMHG

## 2024-09-12 DIAGNOSIS — M54.17 LUMBOSACRAL RADICULOPATHY: ICD-10-CM

## 2024-09-12 DIAGNOSIS — G57.92 MONONEUROPATHY OF LEFT LOWER EXTREMITY: ICD-10-CM

## 2024-09-12 DIAGNOSIS — M47.817 LUMBOSACRAL SPONDYLOSIS WITHOUT MYELOPATHY: ICD-10-CM

## 2024-09-12 DIAGNOSIS — G89.4 CHRONIC PAIN SYNDROME: ICD-10-CM

## 2024-09-12 DIAGNOSIS — G57.91 MONONEUROPATHY OF RIGHT LOWER EXTREMITY: ICD-10-CM

## 2024-09-12 DIAGNOSIS — Z51.81 ENCOUNTER FOR THERAPEUTIC DRUG MONITORING: ICD-10-CM

## 2024-09-12 PROCEDURE — 1090F PRES/ABSN URINE INCON ASSESS: CPT | Performed by: NURSE PRACTITIONER

## 2024-09-12 PROCEDURE — 99213 OFFICE O/P EST LOW 20 MIN: CPT | Performed by: NURSE PRACTITIONER

## 2024-09-12 PROCEDURE — 1123F ACP DISCUSS/DSCN MKR DOCD: CPT | Performed by: NURSE PRACTITIONER

## 2024-09-12 PROCEDURE — 3017F COLORECTAL CA SCREEN DOC REV: CPT | Performed by: NURSE PRACTITIONER

## 2024-09-12 PROCEDURE — 1036F TOBACCO NON-USER: CPT | Performed by: NURSE PRACTITIONER

## 2024-09-12 PROCEDURE — G8417 CALC BMI ABV UP PARAM F/U: HCPCS | Performed by: NURSE PRACTITIONER

## 2024-09-12 PROCEDURE — G8400 PT W/DXA NO RESULTS DOC: HCPCS | Performed by: NURSE PRACTITIONER

## 2024-09-12 PROCEDURE — G8427 DOCREV CUR MEDS BY ELIG CLIN: HCPCS | Performed by: NURSE PRACTITIONER

## 2024-09-12 RX ORDER — HYDROCODONE BITARTRATE AND ACETAMINOPHEN 5; 325 MG/1; MG/1
1 TABLET ORAL EVERY 6 HOURS PRN
Qty: 112 TABLET | Refills: 0 | Status: SHIPPED | OUTPATIENT
Start: 2024-09-12 | End: 2024-10-10

## 2024-10-11 ENCOUNTER — OFFICE VISIT (OUTPATIENT)
Dept: PAIN MANAGEMENT | Age: 74
End: 2024-10-11

## 2024-10-11 VITALS
HEART RATE: 53 BPM | SYSTOLIC BLOOD PRESSURE: 123 MMHG | BODY MASS INDEX: 29.29 KG/M2 | WEIGHT: 176 LBS | OXYGEN SATURATION: 96 % | DIASTOLIC BLOOD PRESSURE: 72 MMHG

## 2024-10-11 DIAGNOSIS — G57.92 MONONEUROPATHY OF LEFT LOWER EXTREMITY: ICD-10-CM

## 2024-10-11 DIAGNOSIS — M54.17 LUMBOSACRAL RADICULOPATHY: ICD-10-CM

## 2024-10-11 DIAGNOSIS — Z51.81 ENCOUNTER FOR THERAPEUTIC DRUG MONITORING: ICD-10-CM

## 2024-10-11 DIAGNOSIS — M47.817 LUMBOSACRAL SPONDYLOSIS WITHOUT MYELOPATHY: Primary | ICD-10-CM

## 2024-10-11 DIAGNOSIS — G57.91 MONONEUROPATHY OF RIGHT LOWER EXTREMITY: ICD-10-CM

## 2024-10-11 DIAGNOSIS — G89.4 CHRONIC PAIN SYNDROME: ICD-10-CM

## 2024-10-11 RX ORDER — TRAMADOL HYDROCHLORIDE 50 MG/1
50 TABLET ORAL EVERY 6 HOURS PRN
Qty: 120 TABLET | Refills: 4 | Status: SHIPPED | OUTPATIENT
Start: 2024-10-11 | End: 2025-03-10

## 2024-10-11 NOTE — PROGRESS NOTES
country for 5 months.  [facility]  When she is out of the country her family brings and delivers the medication to her as they travel with her.  -Overall she has been stable with stable neuropathy pains.  She reports she has been feeling well and denies new acute concerns or illness  -refill tramadol 50mg QID PRN pain   -Follow-up in 5 months or sooner if needed    Analgesic Plan:              Continue present regimen: yes              Adjust dose of present analgesic:no              Switch analgesics:no              Add/Adjust concomitant therapy:no    Current Outpatient Medications   Medication Sig Dispense Refill    traMADol (ULTRAM) 50 MG tablet Take 1 tablet by mouth every 6 hours as needed for Pain for up to 150 days. Max Daily Amount: 200 mg 120 tablet 4    vitamin D (ERGOCALCIFEROL) 1.25 MG (77047 UT) CAPS capsule Take 1 capsule by mouth once a week 12 capsule 1    Multiple Vitamins-Minerals (MULTIVITAMIN ADULTS PO) Take by mouth      aspirin 81 MG EC tablet Take 1 tablet by mouth daily      Handicap Placard MISC by Does not apply route Duration: 5 years 1 each 0    fish oil-omega-3 fatty acids 1000 MG capsule Take 1 capsule by mouth daily       No current facility-administered medications for this visit.     I will continue her current medication regimen  which is part of the above treatment schedule. It has been helping with Ms. Bustos's chronic  medical problems which for this visit include:   The primary encounter diagnosis was Lumbosacral spondylosis without myelopathy. Diagnoses of Encounter for therapeutic drug monitoring, Lumbosacral radiculopathy, Mononeuropathy of left lower extremity, Chronic pain syndrome, and Mononeuropathy of right lower extremity were also pertinent to this visit.   Risks and benefits of the medications and other alternative treatments  including no treatment were discussed with the patient.The common side effects of these medications were also

## 2025-03-12 DIAGNOSIS — E55.9 VITAMIN D DEFICIENCY: ICD-10-CM

## 2025-03-12 RX ORDER — ERGOCALCIFEROL 1.25 MG/1
50000 CAPSULE, LIQUID FILLED ORAL WEEKLY
Qty: 12 CAPSULE | Refills: 1 | Status: SHIPPED | OUTPATIENT
Start: 2025-03-12

## 2025-03-12 NOTE — TELEPHONE ENCOUNTER
Medication:   Requested Prescriptions     Pending Prescriptions Disp Refills    vitamin D (ERGOCALCIFEROL) 1.25 MG (79135 UT) CAPS capsule 12 capsule 1     Sig: Take 1 capsule by mouth once a week        Last Filled:  6/27/2024 # 12 refills 1 ordered.    Patient Phone Number: [phone] (home)     Last appt: 6/26/2024   Next appt: 4/21/2025    Last OARRS:        No data to display

## 2025-03-14 ENCOUNTER — OFFICE VISIT (OUTPATIENT)
Dept: PAIN MANAGEMENT | Age: 75
End: 2025-03-14
Payer: COMMERCIAL

## 2025-03-14 VITALS
OXYGEN SATURATION: 95 % | WEIGHT: 183.4 LBS | DIASTOLIC BLOOD PRESSURE: 62 MMHG | HEART RATE: 71 BPM | BODY MASS INDEX: 30.52 KG/M2 | SYSTOLIC BLOOD PRESSURE: 116 MMHG

## 2025-03-14 DIAGNOSIS — M47.817 LUMBOSACRAL SPONDYLOSIS WITHOUT MYELOPATHY: ICD-10-CM

## 2025-03-14 DIAGNOSIS — G62.9 PERIPHERAL POLYNEUROPATHY: Primary | ICD-10-CM

## 2025-03-14 DIAGNOSIS — G89.4 CHRONIC PAIN SYNDROME: ICD-10-CM

## 2025-03-14 DIAGNOSIS — M54.17 LUMBOSACRAL RADICULOPATHY: ICD-10-CM

## 2025-03-14 DIAGNOSIS — Z51.81 ENCOUNTER FOR THERAPEUTIC DRUG MONITORING: ICD-10-CM

## 2025-03-14 PROCEDURE — G8417 CALC BMI ABV UP PARAM F/U: HCPCS | Performed by: NURSE PRACTITIONER

## 2025-03-14 PROCEDURE — 3017F COLORECTAL CA SCREEN DOC REV: CPT | Performed by: NURSE PRACTITIONER

## 2025-03-14 PROCEDURE — 1036F TOBACCO NON-USER: CPT | Performed by: NURSE PRACTITIONER

## 2025-03-14 PROCEDURE — 1123F ACP DISCUSS/DSCN MKR DOCD: CPT | Performed by: NURSE PRACTITIONER

## 2025-03-14 PROCEDURE — G8400 PT W/DXA NO RESULTS DOC: HCPCS | Performed by: NURSE PRACTITIONER

## 2025-03-14 PROCEDURE — 1090F PRES/ABSN URINE INCON ASSESS: CPT | Performed by: NURSE PRACTITIONER

## 2025-03-14 PROCEDURE — 99214 OFFICE O/P EST MOD 30 MIN: CPT | Performed by: NURSE PRACTITIONER

## 2025-03-14 PROCEDURE — G8427 DOCREV CUR MEDS BY ELIG CLIN: HCPCS | Performed by: NURSE PRACTITIONER

## 2025-03-14 RX ORDER — TRAMADOL HYDROCHLORIDE 50 MG/1
50 TABLET ORAL EVERY 6 HOURS PRN
Qty: 120 TABLET | Refills: 2 | Status: SHIPPED | OUTPATIENT
Start: 2025-03-14 | End: 2025-06-12

## 2025-03-14 NOTE — PROGRESS NOTES
Emily Bustos  1950  [id number]      HISTORY OF PRESENT ILLNESS: Ms. Bustos is a 74 y.o. female returns for a follow up visit for pain management  She has a diagnosis of   1. Peripheral polyneuropathy    2. Lumbosacral spondylosis without myelopathy    3. Encounter for therapeutic drug monitoring    4. Lumbosacral radiculopathy    5. Chronic pain syndrome    .      New Medications since Last Office visit have been reviewed with patient.     As per Information Obtained from the PADT (Patient Assessment and Documentation Tool)    She complains of pain in the bilateral feet. She rates the pain 6/10 and describes it as aching. Current treatment regimen has helped relieve about 40% of the pain since beginning treatment plan.  She denies any side effects from the current pain regimen. Patient reports that since implementation of their treatment plan; their physical functioning is unchanged, family/social relationships are unchanged, mood is unchanged sleep patterns are unchanged, and that the overall functioning is better.  Patient denies/admits that any of the above have changed since last office visit. Patient denies misusing/abusing her narcotic pain medications or using any illegal drugs.      Upon obtaining medical history from Ms. Bustos    ALLERGIES: Patients list of allergies were reviewed     MEDICATIONS: Ms. Bustos list of medications were reviewed.Her current medications are   Outpatient Medications Prior to Visit   Medication Sig Dispense Refill    vitamin D (ERGOCALCIFEROL) 1.25 MG (33824 UT) CAPS capsule Take 1 capsule by mouth once a week 12 capsule 1    Multiple Vitamins-Minerals (MULTIVITAMIN ADULTS PO) Take by mouth      aspirin 81 MG EC tablet Take 1 tablet by mouth daily      Handicap Placard MISC by Does not apply route Duration: 5 years 1 each 0    fish oil-omega-3 fatty acids 1000 MG capsule Take 1 capsule by mouth daily       No facility-administered medications prior to visit.

## 2025-03-18 LAB
6-ACETYLMORPHINE, SALIVA: NOT DETECTED NG/ML
6MAM SAL QL CFM: NEGATIVE
ALPRAZOLAM, SALIVA: NOT DETECTED NG/ML
AMPHETAMINE, SALIVA: NOT DETECTED NG/ML
AMPHETAMINES SCREEN, SALIVA: NEGATIVE
ANTICONVULSANTS SCREEN, SALIVA: NEGATIVE
BENZODIAZEPINES SCREEN, SALIVA: NEGATIVE
BENZOYLECGONINE, SALIVA: NOT DETECTED NG/ML
BUPRENORPHINE SCREEN, SALIVA: NEGATIVE
BUPRENORPHINE, SALIVA: NOT DETECTED NG/ML
CANNABINOIDS SCREEN, SALIVA: NEGATIVE
CARISOPRODOL, SALIVA: NOT DETECTED NG/ML
CLONAZEPAM, SALIVA: NOT DETECTED NG/ML
COCAINE + METABOLITE, SALIVA: NEGATIVE
COCAINE, SALIVA: NOT DETECTED NG/ML
CODEINE, SALIVA: NOT DETECTED NG/ML
COTININE, SALIVA: NOT DETECTED NG/ML
CYCLOBENZAPRINE, SALIVA: NOT DETECTED NG/ML
DESMETHYLDIAZEPAM, SALIVA: NOT DETECTED NG/ML
DIAZEPAM, SALIVA: NOT DETECTED NG/ML
DIHYDROCODEINE, SALIVA: NOT DETECTED NG/ML
DULOXETINE SCREEN, SALIVA: NEGATIVE
DULOXETINE, SALIVA: NOT DETECTED NG/ML
FENTANYL SCREEN, SALIVA: NEGATIVE
FENTANYL, SALIVA: NOT DETECTED NG/ML
FLUNITRAZEPAM, SALIVA: NOT DETECTED NG/ML
FLURAZEPAM, SALIVA: NOT DETECTED NG/ML
GABAPENTIN, SALIVA: NOT DETECTED UG/ML
HYDROCODONE SAL CFM-MCNC: NOT DETECTED NG/ML
HYDROMORPHONE, SALIVA: NOT DETECTED NG/ML
LORAZEPAM: NOT DETECTED NG/ML
MDMA, SALIVA: NOT DETECTED NG/ML
MEPERIDINE, SALIVA: NOT DETECTED NG/ML
MEPROBAMATE, SALIVA: NOT DETECTED NG/ML
METHADONE METABOLITE, SALIVA: NOT DETECTED NG/ML
METHADONE SCREEN, SALIVA: NEGATIVE
METHADONE, SALIVA: NOT DETECTED NG/ML
METHAMPHETAMINE, SALIVA: NOT DETECTED NG/ML
MIDAZOLAM, SALIVA: NOT DETECTED NG/ML
MORPHINE, SALIVA: NOT DETECTED NG/ML
MUSCLE RELAXANTS SCREEN, SALIVA: NEGATIVE
NICOTINE METABOLITE SCREEN, SALIVA: NEGATIVE
NORBUPRENORPHINE, SALIVA: NOT DETECTED NG/ML
NORDIAZEPAM IN SALIVA: NOT DETECTED NG/ML
NORHYDROCODONE, SALIVA: NOT DETECTED NG/ML
NOROXYCODONE, SALIVA: NOT DETECTED NG/ML
OPIATES SAL QL CFM: NEGATIVE
OTHER OPIOIDS SCREEN, SALIVA: ABNORMAL
OXYCODONE+OXYMORPHONE SCREEN, SALIVA: NEGATIVE
OXYCODONE, SALIVA: NOT DETECTED NG/ML
OXYMORPHONE, SALIVA: NOT DETECTED NG/ML
PHENCYCLIDINE SCREEN, SALIVA: NEGATIVE
PHENCYCLIDINE, SALIVA: NOT DETECTED NG/ML
PREGABALIN, SALIVA: NOT DETECTED UG/ML
PROPOXYPHENE, SALIVA: NOT DETECTED NG/ML
SEDATIVE/HYPNOTICS SCREEN, SALIVA: NEGATIVE
TAPENTADOL SCREEN, SALIVA: NEGATIVE
TAPENTADOL, SALIVA: NOT DETECTED NG/ML
TEMAZEPAM, SALIVA: NOT DETECTED NG/ML
TETRAHYDROCANNABINOL, SALIVA: NOT DETECTED NG/ML
TRAMADOL, SALIVA: 20 NG/ML
TRIAZOLAM, SALIVA: NOT DETECTED NG/ML
ZOLPIDEM, SALIVA: NOT DETECTED NG/ML

## 2025-03-23 PROBLEM — G62.9 PERIPHERAL POLYNEUROPATHY: Status: ACTIVE | Noted: 2025-03-23

## 2025-04-20 SDOH — HEALTH STABILITY: PHYSICAL HEALTH: ON AVERAGE, HOW MANY DAYS PER WEEK DO YOU ENGAGE IN MODERATE TO STRENUOUS EXERCISE (LIKE A BRISK WALK)?: 3 DAYS

## 2025-04-20 SDOH — HEALTH STABILITY: PHYSICAL HEALTH: ON AVERAGE, HOW MANY MINUTES DO YOU ENGAGE IN EXERCISE AT THIS LEVEL?: 20 MIN

## 2025-04-21 ENCOUNTER — OFFICE VISIT (OUTPATIENT)
Dept: PRIMARY CARE CLINIC | Age: 75
End: 2025-04-21
Payer: COMMERCIAL

## 2025-04-21 VITALS
HEIGHT: 65 IN | TEMPERATURE: 97.8 F | WEIGHT: 181.4 LBS | HEART RATE: 74 BPM | SYSTOLIC BLOOD PRESSURE: 118 MMHG | OXYGEN SATURATION: 97 % | RESPIRATION RATE: 16 BRPM | BODY MASS INDEX: 30.22 KG/M2 | DIASTOLIC BLOOD PRESSURE: 70 MMHG

## 2025-04-21 DIAGNOSIS — G62.9 PERIPHERAL POLYNEUROPATHY: ICD-10-CM

## 2025-04-21 DIAGNOSIS — R76.8 ANA POSITIVE: ICD-10-CM

## 2025-04-21 DIAGNOSIS — R53.81 CHRONIC FATIGUE AND MALAISE: ICD-10-CM

## 2025-04-21 DIAGNOSIS — R53.82 CHRONIC FATIGUE AND MALAISE: ICD-10-CM

## 2025-04-21 DIAGNOSIS — R73.03 PREDIABETES: Primary | ICD-10-CM

## 2025-04-21 DIAGNOSIS — E78.5 HYPERLIPIDEMIA, UNSPECIFIED HYPERLIPIDEMIA TYPE: ICD-10-CM

## 2025-04-21 DIAGNOSIS — E55.9 VITAMIN D DEFICIENCY: ICD-10-CM

## 2025-04-21 LAB
25(OH)D3 SERPL-MCNC: 26.4 NG/ML
ALBUMIN SERPL-MCNC: 4.3 G/DL (ref 3.4–5)
ALBUMIN/GLOB SERPL: 1.8 {RATIO} (ref 1.1–2.2)
ALP SERPL-CCNC: 74 U/L (ref 40–129)
ALT SERPL-CCNC: 19 U/L (ref 10–40)
ANION GAP SERPL CALCULATED.3IONS-SCNC: 13 MMOL/L (ref 3–16)
AST SERPL-CCNC: 19 U/L (ref 15–37)
BASOPHILS # BLD: 0.1 K/UL (ref 0–0.2)
BASOPHILS NFR BLD: 1.2 %
BILIRUB SERPL-MCNC: <0.2 MG/DL (ref 0–1)
BUN SERPL-MCNC: 14 MG/DL (ref 7–20)
CALCIUM SERPL-MCNC: 9.7 MG/DL (ref 8.3–10.6)
CHLORIDE SERPL-SCNC: 104 MMOL/L (ref 99–110)
CHOLEST SERPL-MCNC: 202 MG/DL (ref 0–199)
CO2 SERPL-SCNC: 25 MMOL/L (ref 21–32)
CREAT SERPL-MCNC: 0.6 MG/DL (ref 0.6–1.2)
DEPRECATED RDW RBC AUTO: 13.4 % (ref 12.4–15.4)
EOSINOPHIL # BLD: 0.2 K/UL (ref 0–0.6)
EOSINOPHIL NFR BLD: 3.3 %
FOLATE SERPL-MCNC: 24.2 NG/ML (ref 4.78–24.2)
GFR SERPLBLD CREATININE-BSD FMLA CKD-EPI: >90 ML/MIN/{1.73_M2}
GLUCOSE SERPL-MCNC: 110 MG/DL (ref 70–99)
HBA1C MFR BLD: 6.2 %
HCT VFR BLD AUTO: 41.1 % (ref 36–48)
HDLC SERPL-MCNC: 37 MG/DL (ref 40–60)
HGB BLD-MCNC: 13.9 G/DL (ref 12–16)
LDLC SERPL CALC-MCNC: 123 MG/DL
LYMPHOCYTES # BLD: 2.1 K/UL (ref 1–5.1)
LYMPHOCYTES NFR BLD: 28.9 %
MCH RBC QN AUTO: 30 PG (ref 26–34)
MCHC RBC AUTO-ENTMCNC: 33.7 G/DL (ref 31–36)
MCV RBC AUTO: 89.1 FL (ref 80–100)
MONOCYTES # BLD: 0.5 K/UL (ref 0–1.3)
MONOCYTES NFR BLD: 7.4 %
NEUTROPHILS # BLD: 4.2 K/UL (ref 1.7–7.7)
NEUTROPHILS NFR BLD: 59.2 %
PLATELET # BLD AUTO: 247 K/UL (ref 135–450)
PMV BLD AUTO: 8.6 FL (ref 5–10.5)
POTASSIUM SERPL-SCNC: 4.8 MMOL/L (ref 3.5–5.1)
PROT SERPL-MCNC: 6.7 G/DL (ref 6.4–8.2)
RBC # BLD AUTO: 4.61 M/UL (ref 4–5.2)
SODIUM SERPL-SCNC: 142 MMOL/L (ref 136–145)
TRIGL SERPL-MCNC: 210 MG/DL (ref 0–150)
TSH SERPL DL<=0.005 MIU/L-ACNC: 1.57 UIU/ML (ref 0.27–4.2)
VIT B12 SERPL-MCNC: 511 PG/ML (ref 211–911)
VLDLC SERPL CALC-MCNC: 42 MG/DL
WBC # BLD AUTO: 7.1 K/UL (ref 4–11)

## 2025-04-21 PROCEDURE — 36415 COLL VENOUS BLD VENIPUNCTURE: CPT | Performed by: FAMILY MEDICINE

## 2025-04-21 PROCEDURE — 1090F PRES/ABSN URINE INCON ASSESS: CPT | Performed by: FAMILY MEDICINE

## 2025-04-21 PROCEDURE — 1036F TOBACCO NON-USER: CPT | Performed by: FAMILY MEDICINE

## 2025-04-21 PROCEDURE — 3017F COLORECTAL CA SCREEN DOC REV: CPT | Performed by: FAMILY MEDICINE

## 2025-04-21 PROCEDURE — 99214 OFFICE O/P EST MOD 30 MIN: CPT | Performed by: FAMILY MEDICINE

## 2025-04-21 PROCEDURE — G8400 PT W/DXA NO RESULTS DOC: HCPCS | Performed by: FAMILY MEDICINE

## 2025-04-21 PROCEDURE — 1123F ACP DISCUSS/DSCN MKR DOCD: CPT | Performed by: FAMILY MEDICINE

## 2025-04-21 PROCEDURE — G2211 COMPLEX E/M VISIT ADD ON: HCPCS | Performed by: FAMILY MEDICINE

## 2025-04-21 PROCEDURE — G8417 CALC BMI ABV UP PARAM F/U: HCPCS | Performed by: FAMILY MEDICINE

## 2025-04-21 PROCEDURE — 83036 HEMOGLOBIN GLYCOSYLATED A1C: CPT | Performed by: FAMILY MEDICINE

## 2025-04-21 PROCEDURE — G8427 DOCREV CUR MEDS BY ELIG CLIN: HCPCS | Performed by: FAMILY MEDICINE

## 2025-04-21 SDOH — ECONOMIC STABILITY: FOOD INSECURITY: WITHIN THE PAST 12 MONTHS, THE FOOD YOU BOUGHT JUST DIDN'T LAST AND YOU DIDN'T HAVE MONEY TO GET MORE.: NEVER TRUE

## 2025-04-21 SDOH — ECONOMIC STABILITY: FOOD INSECURITY: WITHIN THE PAST 12 MONTHS, YOU WORRIED THAT YOUR FOOD WOULD RUN OUT BEFORE YOU GOT MONEY TO BUY MORE.: NEVER TRUE

## 2025-04-21 ASSESSMENT — PATIENT HEALTH QUESTIONNAIRE - PHQ9
SUM OF ALL RESPONSES TO PHQ QUESTIONS 1-9: 0
2. FEELING DOWN, DEPRESSED OR HOPELESS: NOT AT ALL
SUM OF ALL RESPONSES TO PHQ QUESTIONS 1-9: 0
1. LITTLE INTEREST OR PLEASURE IN DOING THINGS: NOT AT ALL

## 2025-04-21 NOTE — PROGRESS NOTES
Panel  - CBC with Auto Differential  - Lipid Panel  - Vitamin B12 & Folate  - Vitamin D 25 Hydroxy  - TSH reflex to FT4, FT3    2. Peripheral polyneuropathy  Stable  Continue with medication  Keep appointments with specialist.   Answered questions   - Comprehensive Metabolic Panel  - CBC with Auto Differential  - Lipid Panel  - Vitamin B12 & Folate  - Vitamin D 25 Hydroxy  - TSH reflex to FT4, FT3    3. PASQUALE positive  Stable  Continue with medication  Keep appointments with specialist.   Answered questions   - Comprehensive Metabolic Panel  - CBC with Auto Differential  - Lipid Panel  - Vitamin B12 & Folate  - Vitamin D 25 Hydroxy  - TSH reflex to FT4, FT3    4. Hyperlipidemia, unspecified hyperlipidemia type  Stable  Continue with medication  Keep appointments with specialist.   Answered questions     5. Vitamin D deficiency  Stable  Continue with medication  Keep appointments with specialist.   Answered questions     6. Chronic fatigue and malaise  Referred for procedure  Educated on the importance of having this done.   Answered questions       No follow-ups on file.    Please note this chart was generated using dragon dictation software.  Although every effort was made to ensure the accuracy of this automated transcription, some errors in transcription may have occurred.

## 2025-04-25 ENCOUNTER — RESULTS FOLLOW-UP (OUTPATIENT)
Dept: PRIMARY CARE CLINIC | Age: 75
End: 2025-04-25

## 2025-06-06 ENCOUNTER — OFFICE VISIT (OUTPATIENT)
Dept: PAIN MANAGEMENT | Age: 75
End: 2025-06-06
Payer: COMMERCIAL

## 2025-06-06 VITALS
WEIGHT: 179.6 LBS | OXYGEN SATURATION: 95 % | BODY MASS INDEX: 29.89 KG/M2 | SYSTOLIC BLOOD PRESSURE: 123 MMHG | DIASTOLIC BLOOD PRESSURE: 69 MMHG | HEART RATE: 61 BPM

## 2025-06-06 DIAGNOSIS — M47.817 LUMBOSACRAL SPONDYLOSIS WITHOUT MYELOPATHY: ICD-10-CM

## 2025-06-06 DIAGNOSIS — M54.17 LUMBOSACRAL RADICULOPATHY: ICD-10-CM

## 2025-06-06 DIAGNOSIS — G89.4 CHRONIC PAIN SYNDROME: ICD-10-CM

## 2025-06-06 DIAGNOSIS — G57.92 MONONEUROPATHY OF LEFT LOWER EXTREMITY: ICD-10-CM

## 2025-06-06 DIAGNOSIS — G57.91 MONONEUROPATHY OF RIGHT LOWER EXTREMITY: ICD-10-CM

## 2025-06-06 DIAGNOSIS — Z51.81 ENCOUNTER FOR THERAPEUTIC DRUG MONITORING: ICD-10-CM

## 2025-06-06 DIAGNOSIS — G62.9 PERIPHERAL POLYNEUROPATHY: Primary | ICD-10-CM

## 2025-06-06 PROCEDURE — 1123F ACP DISCUSS/DSCN MKR DOCD: CPT | Performed by: NURSE PRACTITIONER

## 2025-06-06 PROCEDURE — G8427 DOCREV CUR MEDS BY ELIG CLIN: HCPCS | Performed by: NURSE PRACTITIONER

## 2025-06-06 PROCEDURE — 3017F COLORECTAL CA SCREEN DOC REV: CPT | Performed by: NURSE PRACTITIONER

## 2025-06-06 PROCEDURE — 99214 OFFICE O/P EST MOD 30 MIN: CPT | Performed by: NURSE PRACTITIONER

## 2025-06-06 PROCEDURE — G8417 CALC BMI ABV UP PARAM F/U: HCPCS | Performed by: NURSE PRACTITIONER

## 2025-06-06 PROCEDURE — G8400 PT W/DXA NO RESULTS DOC: HCPCS | Performed by: NURSE PRACTITIONER

## 2025-06-06 PROCEDURE — 1090F PRES/ABSN URINE INCON ASSESS: CPT | Performed by: NURSE PRACTITIONER

## 2025-06-06 PROCEDURE — 1036F TOBACCO NON-USER: CPT | Performed by: NURSE PRACTITIONER

## 2025-06-06 RX ORDER — TRAMADOL HYDROCHLORIDE 50 MG/1
50 TABLET ORAL EVERY 6 HOURS PRN
Qty: 120 TABLET | Refills: 2 | Status: SHIPPED | OUTPATIENT
Start: 2025-06-06 | End: 2025-09-04

## 2025-06-06 NOTE — PROGRESS NOTES
Emily Bustos  1950  [id number]      HISTORY OF PRESENT ILLNESS: Ms. Bustos is a 74 y.o. female returns for a follow up visit for pain management  She has a diagnosis of   1. Peripheral polyneuropathy    2. Lumbosacral spondylosis without myelopathy    3. Encounter for therapeutic drug monitoring    4. Lumbosacral radiculopathy    5. Chronic pain syndrome    6. Mononeuropathy of left lower extremity    7. Mononeuropathy of right lower extremity    .      New Medications since Last Office visit have been reviewed with patient.     As per Information Obtained from the PADT (Patient Assessment and Documentation Tool)    She complains of pain in the bilateral feet. She rates the pain 5/10 and describes it as aching. Current treatment regimen has helped relieve about 50% of the pain since beginning treatment plan.  She denies any side effects from the current pain regimen. Patient reports that since implementation of their treatment plan; their physical functioning is unchanged, family/social relationships are unchanged, mood is unchanged sleep patterns are unchanged, and that the overall functioning is unchanged.  Patient denies/admits that any of the above have changed since last office visit. Patient denies misusing/abusing her narcotic pain medications or using any illegal drugs.      Upon obtaining medical history from Ms. Bustos    ALLERGIES: Patients list of allergies were reviewed     MEDICATIONS: Ms. Bustos list of medications were reviewed.Her current medications are   Outpatient Medications Prior to Visit   Medication Sig Dispense Refill    naloxone 4 MG/0.1ML LIQD nasal spray 1 spray by Nasal route as needed for Opioid Reversal 1 each 0    vitamin D (ERGOCALCIFEROL) 1.25 MG (49089 UT) CAPS capsule Take 1 capsule by mouth once a week 12 capsule 1    Multiple Vitamins-Minerals (MULTIVITAMIN ADULTS PO) Take by mouth      aspirin 81 MG EC tablet Take 1 tablet by mouth daily      Handicap Placard MISC by

## 2025-06-23 DIAGNOSIS — G89.4 CHRONIC PAIN SYNDROME: ICD-10-CM

## 2025-08-28 ENCOUNTER — OFFICE VISIT (OUTPATIENT)
Dept: PAIN MANAGEMENT | Age: 75
End: 2025-08-28
Payer: COMMERCIAL

## 2025-08-28 VITALS
OXYGEN SATURATION: 95 % | WEIGHT: 181.4 LBS | SYSTOLIC BLOOD PRESSURE: 131 MMHG | BODY MASS INDEX: 30.19 KG/M2 | DIASTOLIC BLOOD PRESSURE: 72 MMHG | HEART RATE: 58 BPM

## 2025-08-28 DIAGNOSIS — Z51.81 ENCOUNTER FOR THERAPEUTIC DRUG MONITORING: ICD-10-CM

## 2025-08-28 DIAGNOSIS — M54.17 LUMBOSACRAL RADICULOPATHY: ICD-10-CM

## 2025-08-28 DIAGNOSIS — M17.0 BILATERAL PRIMARY OSTEOARTHRITIS OF KNEE: ICD-10-CM

## 2025-08-28 DIAGNOSIS — G62.9 PERIPHERAL POLYNEUROPATHY: Primary | ICD-10-CM

## 2025-08-28 DIAGNOSIS — G89.4 CHRONIC PAIN SYNDROME: ICD-10-CM

## 2025-08-28 DIAGNOSIS — M47.817 LUMBOSACRAL SPONDYLOSIS WITHOUT MYELOPATHY: ICD-10-CM

## 2025-08-28 PROCEDURE — 1090F PRES/ABSN URINE INCON ASSESS: CPT | Performed by: NURSE PRACTITIONER

## 2025-08-28 PROCEDURE — G8427 DOCREV CUR MEDS BY ELIG CLIN: HCPCS | Performed by: NURSE PRACTITIONER

## 2025-08-28 PROCEDURE — 99214 OFFICE O/P EST MOD 30 MIN: CPT | Performed by: NURSE PRACTITIONER

## 2025-08-28 PROCEDURE — G8400 PT W/DXA NO RESULTS DOC: HCPCS | Performed by: NURSE PRACTITIONER

## 2025-08-28 PROCEDURE — 3017F COLORECTAL CA SCREEN DOC REV: CPT | Performed by: NURSE PRACTITIONER

## 2025-08-28 PROCEDURE — 1036F TOBACCO NON-USER: CPT | Performed by: NURSE PRACTITIONER

## 2025-08-28 PROCEDURE — 1123F ACP DISCUSS/DSCN MKR DOCD: CPT | Performed by: NURSE PRACTITIONER

## 2025-08-28 PROCEDURE — G8417 CALC BMI ABV UP PARAM F/U: HCPCS | Performed by: NURSE PRACTITIONER

## 2025-08-28 RX ORDER — TRAMADOL HYDROCHLORIDE 50 MG/1
50 TABLET ORAL EVERY 6 HOURS PRN
Qty: 120 TABLET | Refills: 2 | Status: SHIPPED | OUTPATIENT
Start: 2025-08-28 | End: 2025-11-26

## 2025-08-28 RX ORDER — LIDOCAINE 50 MG/G
1 PATCH TOPICAL DAILY
Qty: 30 PATCH | Refills: 0 | Status: SHIPPED | OUTPATIENT
Start: 2025-08-28 | End: 2025-09-27